# Patient Record
Sex: MALE | Race: WHITE | Employment: UNEMPLOYED | URBAN - METROPOLITAN AREA
[De-identification: names, ages, dates, MRNs, and addresses within clinical notes are randomized per-mention and may not be internally consistent; named-entity substitution may affect disease eponyms.]

---

## 2020-08-03 ENCOUNTER — HOSPITAL ENCOUNTER (OUTPATIENT)
Dept: LAB | Age: 60
Discharge: HOME | End: 2020-08-03

## 2020-08-03 ENCOUNTER — OFFICE VISIT (OUTPATIENT)
Dept: HEMATOLOGY | Age: 60
End: 2020-08-03

## 2020-08-03 VITALS
HEART RATE: 71 BPM | HEIGHT: 71 IN | SYSTOLIC BLOOD PRESSURE: 145 MMHG | OXYGEN SATURATION: 99 % | TEMPERATURE: 98.3 F | BODY MASS INDEX: 23.8 KG/M2 | WEIGHT: 170 LBS | DIASTOLIC BLOOD PRESSURE: 86 MMHG | RESPIRATION RATE: 18 BRPM

## 2020-08-03 DIAGNOSIS — B18.2 HEP C W/O COMA, CHRONIC (HCC): ICD-10-CM

## 2020-08-03 DIAGNOSIS — B18.2 HEP C W/O COMA, CHRONIC (HCC): Primary | ICD-10-CM

## 2020-08-03 PROBLEM — E55.9 VITAMIN D DEFICIENCY: Status: ACTIVE | Noted: 2020-08-03

## 2020-08-03 PROBLEM — I10 ESSENTIAL HYPERTENSION: Status: ACTIVE | Noted: 2020-08-03

## 2020-08-03 LAB — XX-LABCORP SPECIMEN COL,LCBCF: NORMAL

## 2020-08-03 PROCEDURE — 99001 SPECIMEN HANDLING PT-LAB: CPT

## 2020-08-03 RX ORDER — HYDROXYCHLOROQUINE SULFATE 200 MG/1
TABLET, FILM COATED ORAL
COMMUNITY
Start: 2020-07-20

## 2020-08-03 RX ORDER — ATENOLOL 100 MG/1
TABLET ORAL
COMMUNITY
Start: 2020-07-24

## 2020-08-03 RX ORDER — ERGOCALCIFEROL 1.25 MG/1
CAPSULE ORAL
COMMUNITY
Start: 2020-07-20 | End: 2021-12-07

## 2020-08-03 NOTE — PROGRESS NOTES
VIRTUAL TELEHEALTH VISIT PERFORMED DUE TO COVID-19 EPIDEMIC    CONSENT:  Danette Li, who was seen by synchronous, real-time, audio-video technology, and/or his healthcare decision maker, is aware that this patient-initiated, Telehealth encounter on 8/3/2020 is a billable service, with coverage as determined by his insurance carrier. He is aware that he may receive a bill and has provided verbal consent to proceed. This patient was evaluated during a Virtual Telehealth visit. A caregiver was present if appropriate.  Due to this being a TeleHealth encounter performed during the [facility] public health emergency, the physical examination was limited to that listed in the [facility].

## 2020-08-03 NOTE — PROGRESS NOTES
Identified pt with two pt identifiers(name and ). Reviewed record in preparation for visit and have obtained necessary documentation. Chief Complaint   Patient presents with    Hepatitis     hep c        Health Maintenance Due   Topic    Hepatitis C Screening     DTaP/Tdap/Td series (1 - Tdap)    Lipid Screen     Shingrix Vaccine Age 50> (1 of 2)    FOBT Q1Y Age 54-65     Influenza Age 5 to Adult        Coordination of Care Questionnaire:  :   1) Have you been to an emergency room, urgent care, or hospitalized since your last visit? If yes, where when, and reason for visit? no       2. Have seen or consulted any other health care provider since your last visit? If yes, where when, and reason for visit? NO      3) Do you have an Advanced Directive/ Living Will in place? NO  If yes, do we have a copy on file NO  If no, would you like information NO      Learning Assessment 8/3/2020   PRIMARY LEARNER Patient   HIGHEST LEVEL OF EDUCATION - PRIMARY LEARNER  GRADUATED HIGH SCHOOL OR GED   BARRIERS PRIMARY LEARNER NONE   CO-LEARNER CAREGIVER No   PRIMARY LANGUAGE ENGLISH   LEARNER PREFERENCE PRIMARY LISTENING   ANSWERED BY Patient   RELATIONSHIP SELF        3 most recent PHQ Screens 8/3/2020   Little interest or pleasure in doing things Not at all   Feeling down, depressed, irritable, or hopeless Not at all   Total Score PHQ 2 0        Abuse Screening Questionnaire 8/3/2020   Do you ever feel afraid of your partner? N   Are you in a relationship with someone who physically or mentally threatens you? N   Is it safe for you to go home? Y        Fall Risk Assessment, last 12 mths 8/3/2020   Able to walk? Yes   Fall in past 12 months?  No

## 2020-08-03 NOTE — PROGRESS NOTES
[facility], MD, [address], [place] Yolette Garvin MD Celina Mellow, NATHAN Thakkar, [facility]     Flora Mehta, [facility]   Antonina RILEY Contreras-BOLA    Franky Mayorgarid, [facility]       [facility] 136    at [address], [facility] Batsheva Villegas  22.    [phone]    FAX: [address] [place] [address], [address]    [phone]    FAX: [phone]       Patient Care Team:  Ksenia Jean MD as PCP - General (Family Medicine)  Ralph Boyd MD (Internal Medicine)      The clinicians listed above have asked me to see [facility] in consultation regarding chronic HCV and its management. No medical records were available for review when the patient was here for the appointment. The patient is a 60 y.o. /[place]ian ([place]) male who was found to have abnormalities in liver chemistries and subsequently tested positive for chronic HCV after being incarcerated in 2000. Risk factors for acquiring HCV are IV drug use, inhaling cocaine, tattoos, in 1977. There was no history of acute icteric hepatitis at the time of these risk factors. Imaging of the liver was either not performed or the results are not available to me. An assessment of liver fibrosis with biopsy or elastography has not been performed. The patient has never received treatment for chronic HCV. The patient states that \"everyday is pretty rough\". He complains of extreme fatigue, rashes, itching, lower extremity swelling, numbness and tingling in his legs and feet, sensitivity to sunlight, certain foods and drinks, and poor balance.      The patient is not currently experiencing the following symptoms of liver disease:  fevers, chills, shortness of breath, chest pain, pain in the right side over the liver, diffuse abdominal pain, nausea, vomiting, constipation, diarrhea, dry eyes,   dry mouth, yellowing of the eyes or skin, itching, dark urine, problems concentrating, swelling of the abdomen,   hematemesis, hematochezia. The patient has mild functional limitations due to these complaints. completes all daily activities without any functional limitations. ASSESSMENT AND PLAN:  Chronic HCV   Chronic HCV of unclear severity. No current labs are available for review. Will perform laboratory testing to monitor liver function and degree of liver injury. Will perform and/or review results of HCV viral load and HCV genotype to define the specific treatment and duration of treatment that will be required. Will perform serologic and virologic studies to assess for other causes of chronic liver disease. Will perform imaging of the liver with ultrasound. The degree of fibrosis will be assessed by Fibroscan at his follow up appointment in 4 weeks. Chronic HCV Treatment:  The patient has not been treated for HCV. The patient reports that he has genotype 1A. Will test genotype today. Discussed the treatment alternatives. The SVR/cure rate for HCV now exceeds 97% without significant side effects for most patients with HCV. The specific treatment is dependent upon genotype, viral load and histology. The patient should be treated with Epclusa (sofosbuvir and valpitasvir) for 12 weeks. This treatment regime was explained in detail, including dosing and side effects. Educational material was provided. Screening for Hepatocellular Carcinoma  HCC screening. AFP was ordered today and ultrasound will be scheduled.     Treatment of other medical problems in patients with chronic liver disease  There are no contraindications for the patient to take most medications that are necessary for treatment of other medical issues. The patient does not comsume alcohol. Has been sober for 21 years. Normal doses of acetaminophen, as recommended on the label of the bottle, are not hepatotoxic except in the setting of daily alcohol use, even in patients with cirrhosis and can be utilized for pain. Counseling for alcohol in patients with chronic liver disease  The patient was counseled regarding alcohol consumption and the effect of alcohol on chronic liver disease. Substance Use  The patient was counseled regarding the risk of overdose and death from using opioids. Discussed the risk of becoming reinfected with HCV once they are cured if they resume IV drug use or inhaling drugs nasally. The patient has not used drugs since \"21 years\". Vaccinations   The need for vaccination against viral hepatitis A and B will be assessed with serologic and instituted as appropriate. Routine vaccinations against other bacterial and viral agents can be performed as indicated. Annual flu vaccination should be administered if indicated. ALLERGIES  No Known Allergies    MEDICATIONS  Current Outpatient Medications   Medication Sig    atenoloL (TENORMIN) 100 mg tablet     ergocalciferol (ERGOCALCIFEROL) 1,250 mcg (50,000 unit) capsule     hydrOXYchloroQUINE (PLAQUENIL) 200 mg tablet      No current facility-administered medications for this visit. SYSTEM REVIEW NOT RELATED TO LIVER DISEASE OR REVIEWED ABOVE:  Constitution systems: Negative for fever, chills, weight gain, weight loss. Eyes: Negative for visual changes. ENT: Negative for sore throat, painful swallowing. Respiratory: Negative for cough, hemoptysis, SOB. Cardiology: Negative for chest pain, palpitations. GI:  Negative for constipation or diarrhea. : Negative for urinary frequency, dysuria, hematuria, nocturia. Skin: Negative for rash. Hematology: Negative for easy bruising, blood clots. Musculo-skelatal: Negative for back pain, muscle pain, weakness. Neurologic: Negative for headaches, dizziness, vertigo, memory problems not related to HE. Psychology: Negative for anxiety, depression. FAMILY HISTORY:  The father   of CVA in . The mother  of CVA in . There is no family history of liver disease. The following family members have immune disorders: Mother had Lupus, maternal grandmother had RA,     SOCIAL HISTORY:  The patient has never been . The patient has no children. The patient has never used tobacco products. The patient has never consumed significant amounts of alcohol. The patient has been abstinent from alcohol since  For the last 21 years. The patient is currently receiving disability. The patient was released from [facility] in 2020 after serving a 29 year sentence. PHYSICAL EXAMINATION:  Visit Vitals  /86 (BP 1 Location: Left arm, BP Patient Position: Sitting)   Pulse 71   Temp 98.3 °F (36.8 °C) (Tympanic)   Resp 18   Ht 5' 11\" (1.803 m)   Wt 170 lb (77.1 kg)   SpO2 99%   BMI 23.71 kg/m²     General: No acute distress. Eyes: Sclera anicteric. ENT: No oral lesions. Thyroid normal.  Nodes: No adenopathy. Skin: No spider angiomata. No jaundice. No palmar erythema. Respiratory: Lungs clear to auscultation. Cardiovascular: Regular heart rate. No murmurs. No JVD. Abdomen: Soft non-tender. Liver size normal to percussion/palpation. Spleen not palpable. No obvious ascites. Extremities: No edema. No muscle wasting. No gross arthritic changes. Neurologic: Alert and oriented. Cranial nerves grossly intact. No asterixis. LABORATORY STUDIES:  Recent liver function panel, CBC with platelet count and BMP are not available. These studies will be performed. SEROLOGIES:  Not available or performed. Testing was performed today.     LIVER HISTOLOGY:  Not available or performed    ENDOSCOPIC PROCEDURES:  Not available or performed    RADIOLOGY:  Not available or performed    OTHER TESTING:  Not available or performed    FOLLOW-UP:  All of the issues listed above in the Assessment and Plan were discussed with the patient. All questions were answered. The patient expressed a clear understanding of the above. [facility] 87 in 4 weeks for Fibroscan. He should be on HCV treatment by then.       RIELY Garner-BOLA  Liver [place] of [address], [address] [place] [address] Yessenia Colvin, [address] [place] [address]   [phone]

## 2020-08-10 LAB
A1AT SERPL-MCNC: 121 MG/DL (ref 101–187)
ACTIN IGG SERPL-ACNC: 7 UNITS (ref 0–19)
AFP L3 MFR SERPL: NORMAL % (ref 0–9.9)
AFP SERPL-MCNC: 2.5 NG/ML (ref 0–8)
ALBUMIN SERPL-MCNC: 4.5 G/DL (ref 3.8–4.9)
ALP SERPL-CCNC: 76 IU/L (ref 39–117)
ALT SERPL-CCNC: 32 IU/L (ref 0–44)
ANA TITR SER IF: NEGATIVE {TITER}
AST SERPL-CCNC: 36 IU/L (ref 0–40)
BASOPHILS # BLD AUTO: 0 X10E3/UL (ref 0–0.2)
BASOPHILS NFR BLD AUTO: 0 %
BILIRUB DIRECT SERPL-MCNC: 0.23 MG/DL (ref 0–0.4)
BILIRUB SERPL-MCNC: 0.7 MG/DL (ref 0–1.2)
BUN SERPL-MCNC: 13 MG/DL (ref 8–27)
BUN/CREAT SERPL: 13 (ref 10–24)
C-ANCA TITR SER IF: NORMAL TITER
CALCIUM SERPL-MCNC: 9.2 MG/DL (ref 8.6–10.2)
CHLORIDE SERPL-SCNC: 98 MMOL/L (ref 96–106)
CO2 SERPL-SCNC: 29 MMOL/L (ref 20–29)
CREAT SERPL-MCNC: 1.02 MG/DL (ref 0.76–1.27)
CRYOGLOB SER QL 1D COLD INC: NORMAL
EOSINOPHIL # BLD AUTO: 0.1 X10E3/UL (ref 0–0.4)
EOSINOPHIL NFR BLD AUTO: 2 %
ERYTHROCYTE [DISTWIDTH] IN BLOOD BY AUTOMATED COUNT: 12.4 % (ref 11.6–15.4)
FERRITIN SERPL-MCNC: 96 NG/ML (ref 30–400)
GLUCOSE SERPL-MCNC: 92 MG/DL (ref 65–99)
HAV AB SER QL IA: POSITIVE
HBV CORE AB SERPL QL IA: POSITIVE
HBV SURFACE AB SER QL: REACTIVE
HBV SURFACE AG SERPL QL IA: NEGATIVE
HCT VFR BLD AUTO: 44.1 % (ref 37.5–51)
HCV GENTYP SERPL NAA+PROBE: NORMAL
HCV RNA SERPL NAA+PROBE-ACNC: NORMAL IU/ML
HCV RNA SERPL NAA+PROBE-LOG IU: 5.82 LOG10 IU/ML
HCV RNA SERPL QL NAA+PROBE: POSITIVE
HGB BLD-MCNC: 15.2 G/DL (ref 13–17.7)
HIV 1+2 AB+HIV1 P24 AG SERPL QL IA: NON REACTIVE
IMM GRANULOCYTES # BLD AUTO: 0 X10E3/UL (ref 0–0.1)
IMM GRANULOCYTES NFR BLD AUTO: 0 %
INR PPP: 1 (ref 0.8–1.2)
IRON SATN MFR SERPL: 29 % (ref 15–55)
IRON SERPL-MCNC: 95 UG/DL (ref 38–169)
LYMPHOCYTES # BLD AUTO: 1.3 X10E3/UL (ref 0.7–3.1)
LYMPHOCYTES NFR BLD AUTO: 14 %
MCH RBC QN AUTO: 31.9 PG (ref 26.6–33)
MCHC RBC AUTO-ENTMCNC: 34.5 G/DL (ref 31.5–35.7)
MCV RBC AUTO: 93 FL (ref 79–97)
MITOCHONDRIA M2 IGG SER-ACNC: 112.3 UNITS (ref 0–20)
MONOCYTES # BLD AUTO: 0.6 X10E3/UL (ref 0.1–0.9)
MONOCYTES NFR BLD AUTO: 7 %
NEUTROPHILS # BLD AUTO: 6.6 X10E3/UL (ref 1.4–7)
NEUTROPHILS NFR BLD AUTO: 77 %
P-ANCA ATYPICAL TITR SER IF: NORMAL TITER
P-ANCA TITR SER IF: NORMAL TITER
PLATELET # BLD AUTO: 166 X10E3/UL (ref 150–450)
PLEASE NOTE, 550474: NORMAL
POTASSIUM SERPL-SCNC: 4.3 MMOL/L (ref 3.5–5.2)
PROT SERPL-MCNC: 7 G/DL (ref 6–8.5)
PROTHROMBIN TIME: 10.9 SEC (ref 9.1–12)
RBC # BLD AUTO: 4.77 X10E6/UL (ref 4.14–5.8)
SODIUM SERPL-SCNC: 140 MMOL/L (ref 134–144)
TEST INFORMATION: NORMAL
TIBC SERPL-MCNC: 328 UG/DL (ref 250–450)
UIBC SERPL-MCNC: 233 UG/DL (ref 111–343)
WBC # BLD AUTO: 8.7 X10E3/UL (ref 3.4–10.8)

## 2020-08-14 ENCOUNTER — HOSPITAL ENCOUNTER (OUTPATIENT)
Dept: ULTRASOUND IMAGING | Age: 60
Discharge: HOME | End: 2020-08-14
Payer: MEDICAID

## 2020-08-14 DIAGNOSIS — B18.2 HEP C W/O COMA, CHRONIC (HCC): ICD-10-CM

## 2020-08-14 PROCEDURE — 76705 ECHO EXAM OF ABDOMEN: CPT

## 2020-08-21 ENCOUNTER — TELEPHONE (OUTPATIENT)
Dept: HEMATOLOGY | Age: 60
End: 2020-08-21

## 2020-08-21 RX ORDER — VELPATASVIR AND SOFOSBUVIR 100; 400 MG/1; MG/1
1 TABLET, FILM COATED ORAL DAILY
Qty: 28 TAB | Refills: 2 | Status: SHIPPED | OUTPATIENT
Start: 2020-08-21 | End: 2020-10-19

## 2020-08-21 NOTE — TELEPHONE ENCOUNTER
Updated on current labs and imaging. Epclusa ordered for 12 weeks for [facility] therapy. Follow up as scheduled.

## 2020-09-02 ENCOUNTER — OFFICE VISIT (OUTPATIENT)
Dept: HEMATOLOGY | Age: 60
End: 2020-09-02

## 2020-09-02 VITALS
TEMPERATURE: 96.6 F | DIASTOLIC BLOOD PRESSURE: 85 MMHG | HEIGHT: 71 IN | HEART RATE: 69 BPM | SYSTOLIC BLOOD PRESSURE: 134 MMHG | WEIGHT: 166 LBS | BODY MASS INDEX: 23.24 KG/M2 | OXYGEN SATURATION: 99 %

## 2020-09-02 DIAGNOSIS — B18.2 HEP C W/O COMA, CHRONIC (HCC): Primary | ICD-10-CM

## 2020-09-02 PROBLEM — M35.00 SJOGREN'S SYNDROME (HCC): Status: ACTIVE | Noted: 2020-09-02

## 2020-10-14 ENCOUNTER — HOSPITAL ENCOUNTER (OUTPATIENT)
Dept: LAB | Age: 60
Discharge: HOME | End: 2020-10-14

## 2020-10-14 ENCOUNTER — OFFICE VISIT (OUTPATIENT)
Dept: HEMATOLOGY | Age: 60
End: 2020-10-14
Payer: MEDICAID

## 2020-10-14 VITALS
OXYGEN SATURATION: 98 % | HEART RATE: 84 BPM | BODY MASS INDEX: 24.5 KG/M2 | HEIGHT: 71 IN | WEIGHT: 175 LBS | DIASTOLIC BLOOD PRESSURE: 82 MMHG | SYSTOLIC BLOOD PRESSURE: 147 MMHG | TEMPERATURE: 98 F | RESPIRATION RATE: 17 BRPM

## 2020-10-14 DIAGNOSIS — B18.2 CHRONIC HEPATITIS C WITHOUT HEPATIC COMA (HCC): Primary | ICD-10-CM

## 2020-10-14 DIAGNOSIS — B18.2 CHRONIC HEPATITIS C WITHOUT HEPATIC COMA (HCC): ICD-10-CM

## 2020-10-14 LAB — XX-LABCORP SPECIMEN COL,LCBCF: NORMAL

## 2020-10-14 PROCEDURE — 99001 SPECIMEN HANDLING PT-LAB: CPT

## 2020-10-14 PROCEDURE — 99214 OFFICE O/P EST MOD 30 MIN: CPT | Performed by: NURSE PRACTITIONER

## 2020-10-14 NOTE — PROGRESS NOTES
[address] [facility], MD, Tanner Moreno MD Eudelia Diones, NATHAN Lei, [facility]     Flroa Mehta, [facility]   YAO MarieP-C    Philip Poole, [facility]       [facility] 136    at [place] [address] [address] [facility], [address] [place] [address], [facility] 22.    [phone]    FAX: [address] [place] [address], [address], [address]    [phone]    FAX: [phone]       Patient Care Team:  Kirby Purcell MD as PCP - General (Family Medicine)  Radha Vásquez MD (Internal Medicine)    Problem List  Date Reviewed: 10/14/2020          Codes Class Noted    Sjogren's syndrome [facility]) ICD-10-CM: M35.00  ICD-9-CM: 710.2  9/2/2020        Hep C w/o coma, chronic ([facility] ICD-10-CM: B18.2  ICD-9-CM: 070.54  8/3/2020        Vitamin D deficiency ICD-10-CM: E55.9  ICD-9-CM: 268.9  8/3/2020        Essential hypertension ICD-10-CM: I10  ICD-9-CM: 401.9  8/3/2020              Daniel Wyatt returns to the [address] for education and management of chronic hepatitis C. He began a 12 week antiviral regime on 09/09/2020 with once daily generic Epclusa. This is the only time the HCV has ever been treated. The active problem list, all pertinent past medical history, medications, liver histology, endoscopic studies, radiologic findings and laboratory findings related to the liver disorder were reviewed with the patient. The patient is a 60 y.o. /[place] ([place]) male who was found to have abnormalities in liver chemistries and subsequently tested positive for chronic HCV after being incarcerated in 2000. Risk factors for acquiring HCV are IV drug use, inhaling cocaine, tattoos, in 1977.           There was no history of acute icteric hepatitis at the time of these risk factors. Imaging of the liver was performed with ultrasound in 08/2020. This demonstrated a normal appearing liver. An assessment of liver fibrosis with fibroscan analysis was performed in 09/2020. This indicates no hepatic fibrosis. The patient states that \"everyday is pretty rough\". He complains of extreme fatigue, rashes, itching, lower extremity swelling, numbness and tingling in his legs and feet, sensitivity to sunlight, certain foods and drinks, and poor balance. It is unlikely any of these symptoms are related to the HCV. The patient is not currently experiencing the following symptoms of liver disease:  fevers, chills, shortness of breath, chest pain, pain in the right side over the liver, diffuse abdominal pain, nausea, vomiting, constipation, diarrhea, dry eyes, dry mouth, yellowing of the eyes or skin, itching, dark urine, problems concentrating, swelling of the abdomen, hematemesis, hematochezia. The patient has mild functional limitations due to these complaints. Since the last office appointment the patient:  Had no changes in the liver disease. He began HCV treatment on 09/09/2020 with a 12 week antiviral regime of generic Epclusa. ASSESSMENT AND PLAN:  Chronic HCV   Chronic HCV with no hepatic fibrosis per fibroscan analysis. The most recent laboratory studies indicate that the liver transaminases are normal, alkaline phosphatase is normal, tests of hepatic synthetic and metabolic function are normal, and the platelet count is normal.      Recent imaging demonstrates a normal appearing liver. Serologic evaluation was negative for all causes of chronic liver disease. Chronic HCV Treatment:  The patient has genotype 1A. He is currently in the middle of a 12 week antiviral regime. He denies missing any doses of the medication. He has no treatment related complaints.     Medication education reinforced. Screening for Hepatocellular Carcinoma  HCC screening was recently performed. AFP-L3% was WNL. Ultrasound was unremarkable. Treatment of other medical problems in patients with chronic liver disease  There are no contraindications for the patient to take most medications that are necessary for treatment of other medical issues. The patient does not comsume alcohol. Has been sober for 21 years. Normal doses of acetaminophen, as recommended on the label of the bottle, are not hepatotoxic except in the setting of daily alcohol use, even in patients with cirrhosis and can be utilized for pain. Counseling for alcohol in patients with chronic liver disease  The patient was counseled regarding alcohol consumption and the effect of alcohol on chronic liver disease. Substance Use  The patient was counseled regarding the risk of overdose and death from using opioids. Discussed the risk of becoming reinfected with HCV once they are cured if they resume IV drug use or inhaling drugs nasally. The patient has not used drugs since \"21 years\". Vaccinations   Vaccination for viral hepatitis A and B is not required. The patient has serologic evidence of prior exposure or vaccination with immunity. Routine vaccinations against other bacterial and viral agents can be performed as indicated. Annual flu vaccination should be administered if indicated. ALLERGIES  No Known Allergies    MEDICATIONS  Current Outpatient Medications   Medication Sig    atenoloL (TENORMIN) 100 mg tablet     ergocalciferol (ERGOCALCIFEROL) 1,250 mcg (50,000 unit) capsule     hydrOXYchloroQUINE (PLAQUENIL) 200 mg tablet     sofosbuvir-velpatasvir (EPCLUSA) 400-100 mg tablet TAKE ONE TABLET BY MOUTH ONCE DAILY WITH OR WITHOUT FOOD. STORE AT ROOM TEMPERATURE. No current facility-administered medications for this visit.         SYSTEM REVIEW NOT RELATED TO LIVER DISEASE OR REVIEWED ABOVE:  Constitution systems: Negative for fever, chills, weight gain, weight loss. Eyes: Negative for visual changes. ENT: Negative for sore throat, painful swallowing. Respiratory: Negative for cough, hemoptysis, SOB. Cardiology: Negative for chest pain, palpitations. GI:  Negative for constipation or diarrhea. : Negative for urinary frequency, dysuria, hematuria, nocturia. Skin: Negative for rash. Hematology: Negative for easy bruising, blood clots. Musculo-skelatal: Negative for back pain, muscle pain, weakness. Neurologic: Negative for headaches, dizziness, vertigo, memory problems not related to HE. Psychology: Negative for anxiety, depression. FAMILY HISTORY:  The father   of CVA in . The mother  of CVA in . There is no family history of liver disease. The following family members have immune disorders: Mother had Lupus, maternal grandmother had RA,     SOCIAL HISTORY:  The patient has never been . The patient has no children. The patient has never used tobacco products. The patient has never consumed significant amounts of alcohol. The patient has been abstinent from alcohol since for the last 21 years. The patient is currently receiving disability. The patient was released from detention in 2020 after serving a 29 year sentence. PHYSICAL EXAMINATION:  Visit Vitals  BP (!) 147/82 (BP 1 Location: Left arm, BP Patient Position: Sitting)   Pulse 84   Temp 98 °F (36.7 °C)   Resp 17   Ht 5' 11\" (1.803 m)   Wt 175 lb (79.4 kg)   SpO2 98%   BMI 24.41 kg/m²     General: No acute distress. Eyes: Sclera anicteric. ENT: No oral lesions. Thyroid normal.  Nodes: No adenopathy. Skin: No spider angiomata. No jaundice. No palmar erythema. Respiratory: Lungs clear to auscultation. Cardiovascular: Regular heart rate. No murmurs. No JVD. Abdomen: Soft non-tender. Liver size normal to percussion/palpation. Spleen not palpable.  No obvious ascites. Extremities: No edema. No muscle wasting. No gross arthritic changes. Neurologic: Alert and oriented. Cranial nerves grossly intact. No asterixis. LABORATORY STUDIES:  Liver [place] of [address] [place] [address] Units 10/14/2020 8/3/2020   WBC 3.4 - 10.8 x10E3/uL 7.6 8.7   ANC 1.4 - 7.0 x10E3/uL 5.8 6.6   HGB 13.0 - 17.7 g/dL 16.2 15.2    - 450 x10E3/uL 167 166   INR 0.8 - 1.2  1.0   AST 0 - 40 IU/L 34 36   ALT 0 - 44 IU/L 20 32   Alk Phos 39 - 117 IU/L 85 76   Bili, Total 0.0 - 1.2 mg/dL 0.5 0.7   Bili, Direct 0.00 - 0.40 mg/dL 0.14 0.23   Albumin 3.8 - 4.9 g/dL 4.4 4.5   BUN 8 - 27 mg/dL 11 13   Creat 0.76 - 1.27 mg/dL 1.06 1.02   Na 134 - 144 mmol/L 136 140   K 3.5 - 5.2 mmol/L 4.9 4.3   Cl 96 - 106 mmol/L 98 98   CO2 20 - 29 mmol/L 29 29   Glucose 65 - 99 mg/dL 81 92     SEROLOGIES:  Serologies Latest Ref Rng & Units 8/3/2020   Hep A Ab, Total Negative Positive (A)   Hep B Surface Ag Negative Negative   Hep B Core Ab, Total Negative Positive (A)   Hep B Surface AB QL  Reactive   Hep C Genotype  1a   HCV RT-PCR, Quant IU/mL 662,000   Ferritin 30 - 400 ng/mL 96   Iron % Saturation 15 - 55 % 29   CHELSEA, IFA  Negative   C-ANCA Neg:<1:20 titer <1:20   P-ANCA Neg:<1:20 titer <1:20   ANCA Neg:<1:20 titer <1:20   ASMCA 0 - 19 Units 7   M2 Ab 0.0 - 20.0 Units 112.3 (H)   Alpha-1 antitrypsin level 101 - 187 mg/dL 121       LIVER HISTOLOGY:  09/2020. [facility]. EkPa was 5.6. IQR/med  29%. . The results suggested a fibrosis level of F1 (due to HCV infection). The CAP score suggests fatty liver. ENDOSCOPIC PROCEDURES:  Not available or performed    RADIOLOGY:  08/2020. Ultrasound of the liver. Normal echogenicity. No solid mass. OTHER TESTING:  Not available or performed    FOLLOW-UP:  All of the issues listed above in the Assessment and Plan were discussed with the patient. All questions were answered.   The patient expressed a clear understanding of the above. [address] in 20 weeks to assess for SVR 12.           RILEY Christian-BOLA  Liver [place] of [address], [address] [place] [address], [address] [place] [address]   [phone]

## 2020-10-16 LAB
ALBUMIN SERPL-MCNC: 4.4 G/DL (ref 3.8–4.9)
ALP SERPL-CCNC: 85 IU/L (ref 39–117)
ALT SERPL-CCNC: 20 IU/L (ref 0–44)
AST SERPL-CCNC: 34 IU/L (ref 0–40)
BASOPHILS # BLD AUTO: 0 X10E3/UL (ref 0–0.2)
BASOPHILS NFR BLD AUTO: 0 %
BILIRUB DIRECT SERPL-MCNC: 0.14 MG/DL (ref 0–0.4)
BILIRUB SERPL-MCNC: 0.5 MG/DL (ref 0–1.2)
BUN SERPL-MCNC: 11 MG/DL (ref 8–27)
BUN/CREAT SERPL: 10 (ref 10–24)
CALCIUM SERPL-MCNC: 9.4 MG/DL (ref 8.6–10.2)
CHLORIDE SERPL-SCNC: 98 MMOL/L (ref 96–106)
CO2 SERPL-SCNC: 29 MMOL/L (ref 20–29)
CREAT SERPL-MCNC: 1.06 MG/DL (ref 0.76–1.27)
EOSINOPHIL # BLD AUTO: 0.1 X10E3/UL (ref 0–0.4)
EOSINOPHIL NFR BLD AUTO: 2 %
ERYTHROCYTE [DISTWIDTH] IN BLOOD BY AUTOMATED COUNT: 12.3 % (ref 11.6–15.4)
GLUCOSE SERPL-MCNC: 81 MG/DL (ref 65–99)
HBV SURFACE AG SERPL QL IA: NEGATIVE
HCT VFR BLD AUTO: 46.2 % (ref 37.5–51)
HCV RNA SERPL NAA+PROBE-ACNC: NORMAL IU/ML
HGB BLD-MCNC: 16.2 G/DL (ref 13–17.7)
IMM GRANULOCYTES # BLD AUTO: 0 X10E3/UL (ref 0–0.1)
IMM GRANULOCYTES NFR BLD AUTO: 0 %
LYMPHOCYTES # BLD AUTO: 1.1 X10E3/UL (ref 0.7–3.1)
LYMPHOCYTES NFR BLD AUTO: 14 %
MCH RBC QN AUTO: 32.3 PG (ref 26.6–33)
MCHC RBC AUTO-ENTMCNC: 35.1 G/DL (ref 31.5–35.7)
MCV RBC AUTO: 92 FL (ref 79–97)
MONOCYTES # BLD AUTO: 0.6 X10E3/UL (ref 0.1–0.9)
MONOCYTES NFR BLD AUTO: 8 %
NEUTROPHILS # BLD AUTO: 5.8 X10E3/UL (ref 1.4–7)
NEUTROPHILS NFR BLD AUTO: 76 %
PLATELET # BLD AUTO: 167 X10E3/UL (ref 150–450)
POTASSIUM SERPL-SCNC: 4.9 MMOL/L (ref 3.5–5.2)
PROT SERPL-MCNC: 6.7 G/DL (ref 6–8.5)
RBC # BLD AUTO: 5.02 X10E6/UL (ref 4.14–5.8)
SODIUM SERPL-SCNC: 136 MMOL/L (ref 134–144)
TEST INFORMATION: NORMAL
WBC # BLD AUTO: 7.6 X10E3/UL (ref 3.4–10.8)

## 2020-10-19 RX ORDER — VELPATASVIR AND SOFOSBUVIR 100; 400 MG/1; MG/1
TABLET, FILM COATED ORAL
Qty: 28 TAB | Refills: 1 | Status: SHIPPED | OUTPATIENT
Start: 2020-10-19 | End: 2021-12-07 | Stop reason: ALTCHOICE

## 2020-10-28 ENCOUNTER — HOSPITAL ENCOUNTER (OUTPATIENT)
Dept: LAB | Age: 60
Discharge: HOME | End: 2020-10-28

## 2020-10-28 LAB — XX-LABCORP SPECIMEN COL,LCBCF: NORMAL

## 2020-10-28 PROCEDURE — 99001 SPECIMEN HANDLING PT-LAB: CPT

## 2021-01-12 ENCOUNTER — HOSPITAL ENCOUNTER (OUTPATIENT)
Dept: LAB | Age: 61
Discharge: HOME | End: 2021-01-12

## 2021-01-12 LAB — XX-LABCORP SPECIMEN COL,LCBCF: NORMAL

## 2021-01-12 PROCEDURE — 99001 SPECIMEN HANDLING PT-LAB: CPT

## 2021-03-04 ENCOUNTER — HOSPITAL ENCOUNTER (OUTPATIENT)
Dept: LAB | Age: 61
Discharge: HOME | End: 2021-03-04

## 2021-03-04 ENCOUNTER — OFFICE VISIT (OUTPATIENT)
Dept: HEMATOLOGY | Age: 61
End: 2021-03-04
Payer: MEDICAID

## 2021-03-04 VITALS
DIASTOLIC BLOOD PRESSURE: 73 MMHG | BODY MASS INDEX: 24.78 KG/M2 | RESPIRATION RATE: 17 BRPM | HEIGHT: 71 IN | WEIGHT: 177 LBS | TEMPERATURE: 97.5 F | SYSTOLIC BLOOD PRESSURE: 141 MMHG | OXYGEN SATURATION: 98 % | HEART RATE: 92 BPM

## 2021-03-04 DIAGNOSIS — B18.2 CHRONIC HEPATITIS C WITHOUT HEPATIC COMA (HCC): Primary | ICD-10-CM

## 2021-03-04 LAB — XX-LABCORP SPECIMEN COL,LCBCF: NORMAL

## 2021-03-04 PROCEDURE — 99214 OFFICE O/P EST MOD 30 MIN: CPT | Performed by: NURSE PRACTITIONER

## 2021-03-04 PROCEDURE — 99001 SPECIMEN HANDLING PT-LAB: CPT

## 2021-03-04 NOTE — PROGRESS NOTES
[address] [facility], Brett INIGUEZ Ascencion Sloop, MD Janita Bernhardt, PA-BOLA Ontiveros, [facility]     Flora Mehta, [facility]   Izora Hashimoto, FNCLAUDIA-BOLA Barnes, [facility]       Domitila Copeland Duglas De Acuna 136    at [address], [facility] Batsheva Campuzano  22.    [phone]    FAX: [address] [place] [address], [address]    [phone]    FAX: [phone]       Patient Care Team:  Olya Castro MD as PCP - General (Family Medicine)  Lucas Camarillo MD (Internal Medicine)    Problem List  Date Reviewed: 10/14/2020          Codes Class Noted    Sjogren's syndrome [facility]) ICD-10-CM: M35.00  ICD-9-CM: 710.2  9/2/2020        Hep C w/o coma, chronic ([facility] ICD-10-CM: B18.2  ICD-9-CM: 070.54  8/3/2020        Vitamin D deficiency ICD-10-CM: E55.9  ICD-9-CM: 268.9  8/3/2020        Essential hypertension ICD-10-CM: I10  ICD-9-CM: 401.9  8/3/2020              Wilbert Brewster returns to the [address] for education and management of chronic hepatitis C. He began a 12 week antiviral regime on 09/09/2020 with once daily generic Epclusa and completed the regime on 12/04/2020. This is the only time the HCV has ever been treated. The active problem list, all pertinent past medical history, medications, liver histology, endoscopic studies, radiologic findings and laboratory findings related to the liver disorder were reviewed with the patient. The patient is a 63 y.o. / ([place]) male who was found to have abnormalities in liver chemistries and subsequently tested positive for chronic HCV after being incarcerated in 2000.        Risk factors for acquiring HCV are IV drug use, inhaling cocaine, tattoos, in 1977.          There was no history of acute icteric hepatitis at the time of these risk factors. Imaging of the liver was performed with ultrasound in 08/2020. This demonstrated a normal appearing liver. An assessment of liver fibrosis with fibroscan analysis was performed in 09/2020. This indicates no hepatic fibrosis. The patient is not currently experiencing the following symptoms of liver disease:  fevers, chills, shortness of breath, chest pain, pain in the right side over the liver, diffuse abdominal pain, nausea, vomiting, constipation, diarrhea, dry eyes, dry mouth, yellowing of the eyes or skin, itching, dark urine, problems concentrating, swelling of the abdomen, hematemesis, hematochezia. The patient has mild functional limitations due to these complaints. Since the last office appointment the patient:  Had no changes in the liver disease. He began HCV treatment on 09/09/2020 with a 12 week antiviral regime of generic Epclusa. Finished Epclusa in 12/04/2020. ASSESSMENT AND PLAN:  Chronic HCV   Chronic HCV with no hepatic fibrosis per fibroscan analysis. The most recent laboratory studies indicate that the liver transaminases are normal, alkaline phosphatase is normal, tests of hepatic synthetic and metabolic function are normal, and the platelet count is normal.      Recent imaging demonstrates a normal appearing liver. Serologic evaluation was negative for all causes of chronic liver disease. Chronic HCV Treatment:  The patient has genotype 1A. He completed the 12 weeks regime in early 12/2020. He denies missing any doses of the medication. He has no treatment related complaints. Screening for Hepatocellular Carcinoma  HCC screening was recently performed. AFP-L3% was WNL. Ultrasound was unremarkable.       Treatment of other medical problems in patients with chronic liver disease  There are no contraindications for the patient to take most medications that are necessary for treatment of other medical issues. The patient does not comsume alcohol. Has been sober for 22 years. Normal doses of acetaminophen, as recommended on the label of the bottle, are not hepatotoxic except in the setting of daily alcohol use, even in patients with cirrhosis and can be utilized for pain. Counseling for alcohol in patients with chronic liver disease  The patient was counseled regarding alcohol consumption and the effect of alcohol on chronic liver disease. Substance Use  The patient was counseled regarding the risk of overdose and death from using opioids. Discussed the risk of becoming reinfected with HCV once they are cured if they resume IV drug use or inhaling drugs nasally. The patient has not used drugs since \"21 years\". Vaccinations   Vaccination for viral hepatitis A and B is not required. The patient has serologic evidence of prior exposure or vaccination with immunity. Routine vaccinations against other bacterial and viral agents can be performed as indicated. Annual flu vaccination should be administered if indicated. ALLERGIES  No Known Allergies    MEDICATIONS  Current Outpatient Medications   Medication Sig    sofosbuvir-velpatasvir (EPCLUSA) 400-100 mg tablet TAKE ONE TABLET BY MOUTH ONCE DAILY WITH OR WITHOUT FOOD. STORE AT ROOM TEMPERATURE.  atenoloL (TENORMIN) 100 mg tablet     ergocalciferol (ERGOCALCIFEROL) 1,250 mcg (50,000 unit) capsule     hydrOXYchloroQUINE (PLAQUENIL) 200 mg tablet      No current facility-administered medications for this visit. SYSTEM REVIEW NOT RELATED TO LIVER DISEASE OR REVIEWED ABOVE:  Constitution systems: Negative for fever, chills, weight gain, weight loss. Eyes: Negative for visual changes. ENT: Negative for sore throat, painful swallowing. Respiratory: Negative for cough, hemoptysis, SOB. Cardiology: Negative for chest pain, palpitations.   GI:  Negative for constipation or diarrhea. : Negative for urinary frequency, dysuria, hematuria, nocturia. Skin: Negative for rash. Hematology: Negative for easy bruising, blood clots. Musculo-skelatal: Negative for back pain, muscle pain, weakness. Neurologic: Negative for headaches, dizziness, vertigo, memory problems not related to HE. Psychology: Negative for anxiety, depression. FAMILY HISTORY:  The father   of CVA in . The mother  of CVA in . There is no family history of liver disease. The following family members have immune disorders: Mother had Lupus, maternal grandmother had RA,     SOCIAL HISTORY:  The patient has never been . The patient has no children. The patient has never used tobacco products. The patient has never consumed significant amounts of alcohol. The patient has been abstinent from alcohol since for the last 21 years. The patient is currently receiving disability. The patient was released from [facility] in 2020 after serving a 29 year sentence. PHYSICAL EXAMINATION:  Visit Vitals  BP (!) 141/73 (BP 1 Location: Left upper arm, BP Patient Position: Sitting)   Pulse 92   Temp 97.5 °F (36.4 °C)   Resp 17   Ht 5' 11\" (1.803 m)   Wt 177 lb (80.3 kg)   SpO2 98%   BMI 24.69 kg/m²     General: No acute distress. Eyes: Sclera anicteric. ENT: No oral lesions. Thyroid normal.  Nodes: No adenopathy. Skin: No spider angiomata. No jaundice. No palmar erythema. Respiratory: Lungs clear to auscultation. Cardiovascular: Regular heart rate. No murmurs. No JVD. Abdomen: Soft non-tender. Liver size normal to percussion/palpation. Spleen not palpable. No obvious ascites. Extremities: No edema. No muscle wasting. No gross arthritic changes. Neurologic: Alert and oriented. Cranial nerves grossly intact. No asterixis.     LABORATORY STUDIES:  Liver [place] [address] Units 3/4/2021 10/14/2020 8/3/2020   WBC 3.4 - 10.8 x10E3/uL 6.9 7.6 8.7   ANC 1.4 - 7.0 x10E3/uL 5.2 5.8 6.6   HGB 13.0 - 17.7 g/dL 15.9 16.2 15.2    - 450 x10E3/uL 161 167 166   INR 0.8 - 1.2   1.0   AST 0 - 40 IU/L 32 34 36   ALT 0 - 44 IU/L 21 20 32   Alk Phos 39 - 117 IU/L 85 85 76   Bili, Total 0.0 - 1.2 mg/dL 0.5 0.5 0.7   Bili, Direct 0.00 - 0.40 mg/dL 0.16 0.14 0.23   Albumin 3.8 - 4.8 g/dL 4.6 4.4 4.5   BUN 8 - 27 mg/dL 10 11 13   Creat 0.76 - 1.27 mg/dL 0.87 1.06 1.02   Na 134 - 144 mmol/L 142 136 140   K 3.5 - 5.2 mmol/L 4.3 4.9 4.3   Cl 96 - 106 mmol/L 100 98 98   CO2 20 - 29 mmol/L 27 29 29   Glucose 65 - 99 mg/dL 95 81 92     SEROLOGIES:  Serologies Latest Ref Rng & Units 8/3/2020   Hep A Ab, Total Negative Positive (A)   Hep B Surface Ag Negative Negative   Hep B Core Ab, Total Negative Positive (A)   Hep B Surface AB QL  Reactive   Hep C Genotype  1a   HCV RT-PCR, Quant IU/mL 662,000   Ferritin 30 - 400 ng/mL 96   Iron % Saturation 15 - 55 % 29   CHELSEA, IFA  Negative   C-ANCA Neg:<1:20 titer <1:20   P-ANCA Neg:<1:20 titer <1:20   ANCA Neg:<1:20 titer <1:20   ASMCA 0 - 19 Units 7   M2 Ab 0.0 - 20.0 Units 112.3 (H)   Alpha-1 antitrypsin level 101 - 187 mg/dL 121       LIVER HISTOLOGY:  09/2020. [facility]. EkPa was 5.6. IQR/med  29%. . The results suggested a fibrosis level of F1 (due to HCV infection). The CAP score suggests fatty liver. ENDOSCOPIC PROCEDURES:  Not available or performed    RADIOLOGY:  08/2020. Ultrasound of the liver. Normal echogenicity. No solid mass. OTHER TESTING:  Not available or performed    FOLLOW-UP:  All of the issues listed above in the Assessment and Plan were discussed with the patient. All questions were answered. The patient expressed a clear understanding of the above. [address] in 9 months for repeat fibroscan and labs. This will likely be his last visit at the [facility].        JHON Perales  Liver [address] [place] [address],[address], [address] [place] [address], [address] [place] [address]   [phone]

## 2021-03-05 LAB
ALBUMIN SERPL-MCNC: 4.6 G/DL (ref 3.8–4.8)
ALP SERPL-CCNC: 85 IU/L (ref 39–117)
ALT SERPL-CCNC: 21 IU/L (ref 0–44)
AST SERPL-CCNC: 32 IU/L (ref 0–40)
BASOPHILS # BLD AUTO: 0 X10E3/UL (ref 0–0.2)
BASOPHILS NFR BLD AUTO: 0 %
BILIRUB DIRECT SERPL-MCNC: 0.16 MG/DL (ref 0–0.4)
BILIRUB SERPL-MCNC: 0.5 MG/DL (ref 0–1.2)
BUN SERPL-MCNC: 10 MG/DL (ref 8–27)
BUN/CREAT SERPL: 11 (ref 10–24)
CALCIUM SERPL-MCNC: 9.4 MG/DL (ref 8.6–10.2)
CHLORIDE SERPL-SCNC: 100 MMOL/L (ref 96–106)
CO2 SERPL-SCNC: 27 MMOL/L (ref 20–29)
CREAT SERPL-MCNC: 0.87 MG/DL (ref 0.76–1.27)
EOSINOPHIL # BLD AUTO: 0.1 X10E3/UL (ref 0–0.4)
EOSINOPHIL NFR BLD AUTO: 1 %
ERYTHROCYTE [DISTWIDTH] IN BLOOD BY AUTOMATED COUNT: 12.1 % (ref 11.6–15.4)
GLUCOSE SERPL-MCNC: 95 MG/DL (ref 65–99)
HCT VFR BLD AUTO: 46.7 % (ref 37.5–51)
HGB BLD-MCNC: 15.9 G/DL (ref 13–17.7)
IMM GRANULOCYTES # BLD AUTO: 0 X10E3/UL (ref 0–0.1)
IMM GRANULOCYTES NFR BLD AUTO: 0 %
LYMPHOCYTES # BLD AUTO: 1.1 X10E3/UL (ref 0.7–3.1)
LYMPHOCYTES NFR BLD AUTO: 16 %
MCH RBC QN AUTO: 31.9 PG (ref 26.6–33)
MCHC RBC AUTO-ENTMCNC: 34 G/DL (ref 31.5–35.7)
MCV RBC AUTO: 94 FL (ref 79–97)
MONOCYTES # BLD AUTO: 0.5 X10E3/UL (ref 0.1–0.9)
MONOCYTES NFR BLD AUTO: 7 %
NEUTROPHILS # BLD AUTO: 5.2 X10E3/UL (ref 1.4–7)
NEUTROPHILS NFR BLD AUTO: 76 %
PLATELET # BLD AUTO: 161 X10E3/UL (ref 150–450)
POTASSIUM SERPL-SCNC: 4.3 MMOL/L (ref 3.5–5.2)
PROT SERPL-MCNC: 6.7 G/DL (ref 6–8.5)
RBC # BLD AUTO: 4.98 X10E6/UL (ref 4.14–5.8)
SODIUM SERPL-SCNC: 142 MMOL/L (ref 134–144)
WBC # BLD AUTO: 6.9 X10E3/UL (ref 3.4–10.8)

## 2021-03-06 LAB
HCV RNA SERPL NAA+PROBE-ACNC: NORMAL IU/ML
TEST INFORMATION: NORMAL

## 2021-12-07 ENCOUNTER — OFFICE VISIT (OUTPATIENT)
Dept: HEMATOLOGY | Age: 61
End: 2021-12-07
Payer: MEDICAID

## 2021-12-07 ENCOUNTER — HOSPITAL ENCOUNTER (OUTPATIENT)
Dept: LAB | Age: 61
Discharge: HOME | End: 2021-12-07

## 2021-12-07 VITALS
WEIGHT: 177 LBS | HEART RATE: 66 BPM | RESPIRATION RATE: 16 BRPM | DIASTOLIC BLOOD PRESSURE: 68 MMHG | OXYGEN SATURATION: 98 % | BODY MASS INDEX: 24.78 KG/M2 | HEIGHT: 71 IN | SYSTOLIC BLOOD PRESSURE: 121 MMHG | TEMPERATURE: 97.1 F

## 2021-12-07 DIAGNOSIS — B18.2 CHRONIC HEPATITIS C WITHOUT HEPATIC COMA (HCC): Primary | ICD-10-CM

## 2021-12-07 PROBLEM — Z86.19 HEPATITIS C VIRUS INFECTION CURED AFTER ANTIVIRAL DRUG THERAPY: Status: ACTIVE | Noted: 2021-12-07

## 2021-12-07 LAB — XX-LABCORP SPECIMEN COL,LCBCF: NORMAL

## 2021-12-07 PROCEDURE — 99001 SPECIMEN HANDLING PT-LAB: CPT

## 2021-12-07 PROCEDURE — 91200 LIVER ELASTOGRAPHY: CPT | Performed by: NURSE PRACTITIONER

## 2021-12-07 PROCEDURE — 99214 OFFICE O/P EST MOD 30 MIN: CPT | Performed by: NURSE PRACTITIONER

## 2021-12-07 RX ORDER — GABAPENTIN 100 MG/1
CAPSULE ORAL
COMMUNITY
Start: 2021-01-26

## 2021-12-07 RX ORDER — SILDENAFIL 100 MG/1
TABLET, FILM COATED ORAL DAILY
COMMUNITY
Start: 2021-03-16

## 2021-12-07 RX ORDER — SULFAMETHOXAZOLE AND TRIMETHOPRIM 800; 160 MG/1; MG/1
1 TABLET ORAL 2 TIMES DAILY
COMMUNITY
Start: 2021-11-29 | End: 2021-12-29

## 2021-12-07 NOTE — PROGRESS NOTES
[facility], MD, MD Selvin Amado PA-C Almetta Gaudier, [facility]     Flora Mehta, [facility]   Melodie Barnes P-BOLA Meyers Asp, [facility]       Domitila Ardon De Acuna 136    at [address], [address] Batsheva Villegas  22.    [phone]    FAX: [facility]    at [address], [address]    [phone]    FAX: [phone]       Patient Care Team:  Calin Aguilera MD as PCP - General (Family Medicine)  Manisha Madison MD (Internal Medicine)    Problem List  Date Reviewed: 12/7/2021          Codes Class Noted    Hepatitis C virus infection cured after antiviral drug therapy ICD-10-CM: Z86.19  ICD-9-CM: V12.09  12/7/2021        Sjogren's syndrome ([facility].) ICD-10-CM: M35.00  ICD-9-CM: 710.2  9/2/2020        Vitamin D deficiency ICD-10-CM: E55.9  ICD-9-CM: 268.9  8/3/2020        Essential hypertension ICD-10-CM: I10  ICD-9-CM: 401.9  8/3/2020              Dany Bower returns to the [facility] today for fibroscan assessment of hepatic fibrosis and for education and management of chronic hepatitis C. He began a 12 week antiviral regime on 09/09/2020 with once daily generic Epclusa and completed the regime on 12/04/2020. This is the only time the HCV has ever been treated. HCV has been eradicated. The active problem list, all pertinent past medical history, medications, liver histology, endoscopic studies, radiologic findings and laboratory findings related to the liver disorder were reviewed with the patient. The patient is a 57 y.o.  /[place] ([place]) male who was found to have abnormalities in liver chemistries and subsequently tested positive for chronic HCV after being incarcerated in 2000. Risk factors for acquiring HCV are IV drug use, inhaling cocaine, tattoos, in 1977. There was no history of acute icteric hepatitis at the time of these risk factors. Imaging of the liver was performed with ultrasound in 08/2020. This demonstrated a normal appearing liver. An assessment of liver fibrosis with fibroscan analysis was performed in 09/2020. This indicates no hepatic fibrosis. The fibroscan was repeated during this appointment. Results of the scan indicate normal liver stiffness. The patient is not currently experiencing the following symptoms of liver disease:  fevers, chills, shortness of breath, chest pain, pain in the right side over the liver, diffuse abdominal pain, nausea, vomiting, constipation, diarrhea, dry eyes, dry mouth, yellowing of the eyes or skin, itching, dark urine, problems concentrating, swelling of the abdomen, hematemesis, hematochezia. The patient has mild functional limitations due to these complaints. Since the last office appointment the patient:  Had no changes in the liver disease. ASSESSMENT AND PLAN:  Chronic HCV   Chronic HCV with no hepatic fibrosis per fibroscan analysis. The HCV has been treated and cured. The most recent laboratory studies indicate that the liver transaminases are normal, alkaline phosphatase is normal, tests of hepatic synthetic and metabolic function are normal, and the platelet count is normal.      Recent imaging demonstrates a normal appearing liver. Serologic evaluation was negative for all causes of chronic liver disease. Chronic HCV Treatment:  The patient had genotype 1A. He completed the 12 weeks regime in early 12/2020. He denies missing any doses of the medication. He has no treatment related complaints. The HCV infection has been eradicated. Patient remains virus free, SVR (sustained virological response).      No further follow ups are needed here. Fibrosis score  The need to perform an assessment of liver fibrosis was discussed with the patient. The Fibroscan can assess liver fibrosis and determine if a patient has advanced fibrosis or cirrhosis without the need for liver biopsy. This was performed in 09/2020 and indicates normal liver stiffness (no fibrosis). The fibroscan was repeated during this appointment. Results of the scan indicate normal liver stiffness and there are no indications of fatty liver disease. Screening for Hepatocellular Carcinoma  HCC screening was recently performed. AFP-L3% was WNL. Ultrasound was unremarkable. Treatment of other medical problems in patients with chronic liver disease  There are no contraindications for the patient to take most medications that are necessary for treatment of other medical issues. The patient does not comsume alcohol. Has been sober for 23 years. Normal doses of acetaminophen, as recommended on the label of the bottle, are not hepatotoxic except in the setting of daily alcohol use, even in patients with cirrhosis and can be utilized for pain. Counseling for alcohol in patients with chronic liver disease  The patient was counseled regarding alcohol consumption and the effect of alcohol on chronic liver disease. Substance Use  The patient was counseled regarding the risk of overdose and death from using opioids. Discussed the risk of becoming reinfected with HCV once they are cured if they resume IV drug use or inhaling drugs nasally. The patient has not used drugs since \"23 years\". Vaccinations   Vaccination for viral hepatitis A and B is not required. The patient has serologic evidence of prior exposure or vaccination with immunity. Routine vaccinations against other bacterial and viral agents can be performed as indicated. Annual flu vaccination should be administered if indicated.     ALLERGIES  No Known Allergies    MEDICATIONS  Current Outpatient Medications   Medication Sig    sofosbuvir-velpatasvir (EPCLUSA) 400-100 mg tablet TAKE ONE TABLET BY MOUTH ONCE DAILY WITH OR WITHOUT FOOD. STORE AT ROOM TEMPERATURE.  atenoloL (TENORMIN) 100 mg tablet     ergocalciferol (ERGOCALCIFEROL) 1,250 mcg (50,000 unit) capsule     hydrOXYchloroQUINE (PLAQUENIL) 200 mg tablet      No current facility-administered medications for this visit. SYSTEM REVIEW NOT RELATED TO LIVER DISEASE OR REVIEWED ABOVE:  Constitution systems: Negative for fever, chills, weight gain, weight loss. Eyes: Negative for visual changes. ENT: Negative for sore throat, painful swallowing. Respiratory: Negative for cough, hemoptysis, SOB. Cardiology: Negative for chest pain, palpitations. GI:  Negative for constipation or diarrhea. : Negative for urinary frequency, dysuria, hematuria, nocturia. Skin: Negative for rash. Hematology: Negative for easy bruising, blood clots. Musculo-skelatal: Negative for back pain, muscle pain, weakness. Neurologic: Negative for headaches, dizziness, vertigo, memory problems not related to HE. Psychology: Negative for anxiety, depression. FAMILY HISTORY:  The father   of CVA in . The mother  of CVA in . There is no family history of liver disease. The following family members have immune disorders: Mother had Lupus, maternal grandmother had RA,     SOCIAL HISTORY:  The patient has never been . The patient has no children. The patient has never used tobacco products. The patient has never consumed significant amounts of alcohol. The patient has been abstinent from alcohol since for the last 21 years. The patient is currently receiving disability. The patient was released from [facility] in 2020 after serving a 29 year sentence.         PHYSICAL EXAMINATION:  Visit Vitals  /68 (BP 1 Location: Left upper arm, BP Patient Position: Sitting, BP Cuff Size: Small adult)   Pulse 66   Temp 97.1 °F (36.2 °C)   Resp 16   Ht 5' 11\" (1.803 m)   Wt 177 lb (80.3 kg)   SpO2 98%   BMI 24.69 kg/m²     General: No acute distress. Eyes: Sclera anicteric. ENT: No oral lesions. Thyroid normal.  Nodes: No adenopathy. Skin: No spider angiomata. No jaundice. No palmar erythema. Respiratory: Lungs clear to auscultation. Cardiovascular: Regular heart rate. No murmurs. No JVD. Abdomen: Soft non-tender. Liver size normal to percussion/palpation. Spleen not palpable. No obvious ascites. Extremities: No edema. No muscle wasting. No gross arthritic changes. Neurologic: Alert and oriented. Cranial nerves grossly intact. No asterixis. LABORATORY STUDIES:  Liver [place] [address] Units 12/7/2021 3/4/2021   WBC 3.4 - 10.8 x10E3/uL 6.2 6.9   ANC 1.4 - 7.0 x10E3/uL 4.3 5.2   HGB 13.0 - 17.7 g/dL 15.4 15.9    - 450 x10E3/uL 222 161   INR 0.8 - 1.2     AST 0 - 40 IU/L 32 32   ALT 0 - 44 IU/L 21 21   Alk Phos 44 - 121 IU/L 77 85   Bili, Total 0.0 - 1.2 mg/dL 0.4 0.5   Bili, Direct 0.00 - 0.40 mg/dL 0.13 0.16   Albumin 3.8 - 4.8 g/dL 4.5 4.6   BUN 8 - 27 mg/dL 10 10   Creat 0.76 - 1.27 mg/dL 1.22 0.87   Na 134 - 144 mmol/L 136 142   K 3.5 - 5.2 mmol/L 4.6 4.3   Cl 96 - 106 mmol/L 98 100   CO2 20 - 29 mmol/L 26 27   Glucose 65 - 99 mg/dL 90 95     SEROLOGIES:  Serologies Latest Ref Rng & Units 8/3/2020   Hep A Ab, Total Negative Positive (A)   Hep B Surface Ag Negative Negative   Hep B Core Ab, Total Negative Positive (A)   Hep B Surface AB QL  Reactive   Hep C Genotype  1a   HCV RT-PCR, Quant IU/mL 662,000   Ferritin 30 - 400 ng/mL 96   Iron % Saturation 15 - 55 % 29   CHELSEA, IFA  Negative   C-ANCA Neg:<1:20 titer <1:20   P-ANCA Neg:<1:20 titer <1:20   ANCA Neg:<1:20 titer <1:20   ASMCA 0 - 19 Units 7   M2 Ab 0.0 - 20.0 Units 112.3 (H)   Alpha-1 antitrypsin level 101 - 187 mg/dL 121       LIVER HISTOLOGY:  09/2020. FibroScan performed at [address].  Chris was 5.6. IQR/med  29%. . The results suggested a fibrosis level of F1 (due to HCV infection). The CAP score suggests fatty liver. 12/2021. FibroScan performed at [address]. EkPa was 3.7. IQR/med 20%. . The results suggested a fibrosis level of F0. The CAP score suggests there is no significant hepatic steatosis. ENDOSCOPIC PROCEDURES:  Not available or performed    RADIOLOGY:  08/2020. Ultrasound of the liver. Normal echogenicity. No solid mass. OTHER TESTING:  Not available or performed    FOLLOW-UP:  All of the issues listed above in the Assessment and Plan were discussed with the patient. All questions were answered. The patient expressed a clear understanding of the above. Follow-up [facility] on a PRN basis.       RILEY Loving-BOLA  Liver [place] [address]., [address] [place] [address]   [phone]    l

## 2021-12-09 LAB
ALBUMIN SERPL-MCNC: 4.5 G/DL (ref 3.8–4.8)
ALP SERPL-CCNC: 77 IU/L (ref 44–121)
ALT SERPL-CCNC: 21 IU/L (ref 0–44)
AST SERPL-CCNC: 32 IU/L (ref 0–40)
BASOPHILS # BLD AUTO: 0 X10E3/UL (ref 0–0.2)
BASOPHILS NFR BLD AUTO: 1 %
BILIRUB DIRECT SERPL-MCNC: 0.13 MG/DL (ref 0–0.4)
BILIRUB SERPL-MCNC: 0.4 MG/DL (ref 0–1.2)
BUN SERPL-MCNC: 10 MG/DL (ref 8–27)
BUN/CREAT SERPL: 8 (ref 10–24)
CALCIUM SERPL-MCNC: 9.2 MG/DL (ref 8.6–10.2)
CHLORIDE SERPL-SCNC: 98 MMOL/L (ref 96–106)
CO2 SERPL-SCNC: 26 MMOL/L (ref 20–29)
CREAT SERPL-MCNC: 1.22 MG/DL (ref 0.76–1.27)
EOSINOPHIL # BLD AUTO: 0.1 X10E3/UL (ref 0–0.4)
EOSINOPHIL NFR BLD AUTO: 1 %
ERYTHROCYTE [DISTWIDTH] IN BLOOD BY AUTOMATED COUNT: 12.2 % (ref 11.6–15.4)
GLUCOSE SERPL-MCNC: 90 MG/DL (ref 65–99)
HCT VFR BLD AUTO: 44.9 % (ref 37.5–51)
HCV RNA SERPL NAA+PROBE-ACNC: NORMAL IU/ML
HGB BLD-MCNC: 15.4 G/DL (ref 13–17.7)
IMM GRANULOCYTES # BLD AUTO: 0 X10E3/UL (ref 0–0.1)
IMM GRANULOCYTES NFR BLD AUTO: 1 %
LYMPHOCYTES # BLD AUTO: 1.2 X10E3/UL (ref 0.7–3.1)
LYMPHOCYTES NFR BLD AUTO: 19 %
MCH RBC QN AUTO: 32.1 PG (ref 26.6–33)
MCHC RBC AUTO-ENTMCNC: 34.3 G/DL (ref 31.5–35.7)
MCV RBC AUTO: 94 FL (ref 79–97)
MONOCYTES # BLD AUTO: 0.5 X10E3/UL (ref 0.1–0.9)
MONOCYTES NFR BLD AUTO: 9 %
NEUTROPHILS # BLD AUTO: 4.3 X10E3/UL (ref 1.4–7)
NEUTROPHILS NFR BLD AUTO: 69 %
PLATELET # BLD AUTO: 222 X10E3/UL (ref 150–450)
POTASSIUM SERPL-SCNC: 4.6 MMOL/L (ref 3.5–5.2)
PROT SERPL-MCNC: 6.6 G/DL (ref 6–8.5)
RBC # BLD AUTO: 4.8 X10E6/UL (ref 4.14–5.8)
SODIUM SERPL-SCNC: 136 MMOL/L (ref 134–144)
TEST INFORMATION: NORMAL
WBC # BLD AUTO: 6.2 X10E3/UL (ref 3.4–10.8)

## 2021-12-16 ENCOUNTER — TRANSCRIBE ORDER (OUTPATIENT)
Dept: SCHEDULING | Age: 61
End: 2021-12-16

## 2021-12-16 DIAGNOSIS — Z87.438 HISTORY OF CHRONIC PROSTATITIS: Primary | ICD-10-CM

## 2021-12-21 ENCOUNTER — HOSPITAL ENCOUNTER (OUTPATIENT)
Dept: ULTRASOUND IMAGING | Age: 61
Discharge: HOME | End: 2021-12-21
Attending: INTERNAL MEDICINE
Payer: MEDICAID

## 2021-12-21 DIAGNOSIS — Z87.438 HISTORY OF CHRONIC PROSTATITIS: ICD-10-CM

## 2021-12-21 PROCEDURE — 76700 US EXAM ABDOM COMPLETE: CPT

## 2021-12-21 PROCEDURE — 76856 US EXAM PELVIC COMPLETE: CPT

## 2021-12-23 ENCOUNTER — TRANSCRIBE ORDER (OUTPATIENT)
Dept: SCHEDULING | Age: 61
End: 2021-12-23

## 2021-12-23 DIAGNOSIS — Z98.890 STATUS POST REPAIR OF HYDROCELE: Primary | ICD-10-CM

## 2021-12-23 DIAGNOSIS — Z87.438 STATUS POST REPAIR OF HYDROCELE: Primary | ICD-10-CM

## 2021-12-31 ENCOUNTER — TRANSCRIBE ORDER (OUTPATIENT)
Dept: SCHEDULING | Age: 61
End: 2021-12-31

## 2021-12-31 DIAGNOSIS — Z87.438 HX OF CHRONIC PROSTATITIS: Primary | ICD-10-CM

## 2022-03-18 PROBLEM — E55.9 VITAMIN D DEFICIENCY: Status: ACTIVE | Noted: 2020-08-03

## 2022-03-19 PROBLEM — M35.00 SJOGREN'S SYNDROME (HCC): Status: ACTIVE | Noted: 2020-09-02

## 2022-03-19 PROBLEM — Z86.19 HEPATITIS C VIRUS INFECTION CURED AFTER ANTIVIRAL DRUG THERAPY: Status: ACTIVE | Noted: 2021-12-07

## 2022-03-20 PROBLEM — I10 ESSENTIAL HYPERTENSION: Status: ACTIVE | Noted: 2020-08-03

## 2023-06-18 NOTE — PROGRESS NOTES
[facility], MD, MD Margie Akbar PA-C Janeann Barters, [facility]     April S Janine, [facility]   Evan Donahue [facility] [place], [facility]       Domitiladre Copeland [facility] 136    at [address]    [address] [place] [address], [facility] Batsheva Villegas  22.    [phone]    FAX: [address] [place] [address] Avenue    [address], [address]    [phone]    FAX: [phone]       Patient Care Team:  Miguel Solis MD as PCP - General (Family Medicine)  Yuri Kennedy MD (Internal Medicine)    Problem List  Date Reviewed: 8/3/2020          Codes Class Noted    Hep C w/o coma, chronic ([facility] ICD-10-CM: B18.2  ICD-9-CM: 070.54  8/3/2020        Vitamin D deficiency ICD-10-CM: E55.9  ICD-9-CM: 268.9  8/3/2020        Essential hypertension ICD-10-CM: I10  ICD-9-CM: 401.9  8/3/2020              Shraddha Carlson returns to the [place] [address] today for fibroscan assessment of hepatic fibrosis and for education and management of chronic hepatitis C    The active problem list, all pertinent past medical history, medications, liver histology, endoscopic studies, radiologic findings and laboratory findings related to the liver disorder were reviewed with the patient. The patient is a 60 y.o. / ([place]) male who was found to have abnormalities in liver chemistries and subsequently tested positive for chronic HCV after being incarcerated in 2000. Risk factors for acquiring HCV are IV drug use, inhaling cocaine, tattoos, in 1977. There was no history of acute icteric hepatitis at the time of these risk factors. Imaging of the liver was either not performed or the results are not available to me.       An assessment of Per patient unable to transfer to recliner this am 2/2 left leg fracture, encouraged incentive spirometer 10 x hour to prevent pneumonia, verbally understood, return demonstrated correct technique liver fibrosis with fibroscan analysis was performed in the office today. This suggests no hepatic fibrosis. The patient has never received treatment for chronic HCV. Antiviral medication has been ordered and approved. The patient is set to begin HCV treatment on 09/08/2020 with a 12 weeks course of Epclusa. The patient states that \"everyday is pretty rough\". He complains of extreme fatigue, rashes, itching, lower extremity swelling, numbness and tingling in his legs and feet, sensitivity to sunlight, certain foods and drinks, and poor balance. The patient is not currently experiencing the following symptoms of liver disease:  fevers, chills, shortness of breath, chest pain, pain in the right side over the liver, diffuse abdominal pain, nausea, vomiting, constipation, diarrhea, dry eyes,   dry mouth, yellowing of the eyes or skin, itching, dark urine, problems concentrating, swelling of the abdomen,   hematemesis, hematochezia. The patient has mild functional limitations due to these complaints. completes all daily activities without any functional limitations. Since the last office appointment the patient:  Had no changes in the liver disease. Will begin HCV on either Sep. 8th or 9th when the Elijah Jo arrives. ASSESSMENT AND PLAN:  Chronic HCV   Chronic HCV with no hepatic fibrosis per today's fibroscan analysis. The most recent laboratory studies indicate that the liver transaminases are normal, alkaline phosphatase is normal, tests of hepatic synthetic and metabolic function are normal, and the platelet count is normal.      Recent imaging demonstrates a normal appearing liver. Serologic evaluation was negative for all causes of chronic liver disease. Chronic HCV Treatment:  The patient has not been treated for HCV. The patient reports that he has genotype 1A. Elijah Sandro has been ordered and approved.   The patient reports that he has been informed that the medication will be delivered on 09/08/2020. He will begin the 12 week antiviral regime then    Medication education reinforced. Educational material was provided. Screening for Hepatocellular Carcinoma  HCC screening was recently performed. AFP-L3% was WNL. Ultrasound was unremarkable. Treatment of other medical problems in patients with chronic liver disease  There are no contraindications for the patient to take most medications that are necessary for treatment of other medical issues. The patient does not comsume alcohol. Has been sober for 21 years. Normal doses of acetaminophen, as recommended on the label of the bottle, are not hepatotoxic except in the setting of daily alcohol use, even in patients with cirrhosis and can be utilized for pain. Counseling for alcohol in patients with chronic liver disease  The patient was counseled regarding alcohol consumption and the effect of alcohol on chronic liver disease. Substance Use  The patient was counseled regarding the risk of overdose and death from using opioids. Discussed the risk of becoming reinfected with HCV once they are cured if they resume IV drug use or inhaling drugs nasally. The patient has not used drugs since \"21 years\". Vaccinations   Vaccination for viral hepatitis A and B is not required. The patient has serologic evidence of prior exposure or vaccination with immunity. Routine vaccinations against other bacterial and viral agents can be performed as indicated. Annual flu vaccination should be administered if indicated. ALLERGIES  No Known Allergies    MEDICATIONS  Current Outpatient Medications   Medication Sig    sofosbuvir-velpatasvir (EPCLUSA) 400-100 mg tablet Take 1 Tab by mouth daily for 84 days.  Indications: chronic infection of genotype 1 hepatitis C virus    atenoloL (TENORMIN) 100 mg tablet     ergocalciferol (ERGOCALCIFEROL) 1,250 mcg (50,000 unit) capsule     hydrOXYchloroQUINE (PLAQUENIL) 200 mg tablet      No current facility-administered medications for this visit. SYSTEM REVIEW NOT RELATED TO LIVER DISEASE OR REVIEWED ABOVE:  Constitution systems: Negative for fever, chills, weight gain, weight loss. Eyes: Negative for visual changes. ENT: Negative for sore throat, painful swallowing. Respiratory: Negative for cough, hemoptysis, SOB. Cardiology: Negative for chest pain, palpitations. GI:  Negative for constipation or diarrhea. : Negative for urinary frequency, dysuria, hematuria, nocturia. Skin: Negative for rash. Hematology: Negative for easy bruising, blood clots. Musculo-skelatal: Negative for back pain, muscle pain, weakness. Neurologic: Negative for headaches, dizziness, vertigo, memory problems not related to HE. Psychology: Negative for anxiety, depression. FAMILY HISTORY:  The father   of CVA in . The mother  of CVA in . There is no family history of liver disease. The following family members have immune disorders: Mother had Lupus, maternal grandmother had RA,     SOCIAL HISTORY:  The patient has never been . The patient has no children. The patient has never used tobacco products. The patient has never consumed significant amounts of alcohol. The patient has been abstinent from alcohol since  For the last 21 years. The patient is currently receiving disability. The patient was released from [facility] in 2020 after serving a 29 year sentence. PHYSICAL EXAMINATION:  Visit Vitals  /85   Pulse 69   Temp (!) 96.6 °F (35.9 °C) (Tympanic)   Ht 5' 11\" (1.803 m)   Wt 166 lb (75.3 kg)   SpO2 99%   BMI 23.15 kg/m²     General: No acute distress. Eyes: Sclera anicteric. ENT: No oral lesions. Thyroid normal.  Nodes: No adenopathy. Skin: No spider angiomata. No jaundice. No palmar erythema. Respiratory: Lungs clear to auscultation. Cardiovascular: Regular heart rate. No murmurs. No JVD. Abdomen: Soft non-tender. Liver size normal to percussion/palpation. Spleen not palpable. No obvious ascites. Extremities: No edema. No muscle wasting. No gross arthritic changes. Neurologic: Alert and oriented. Cranial nerves grossly intact. No asterixis. LABORATORY STUDIES:  Liver [place] [address] Units 8/3/2020   WBC 3.4 - 10.8 x10E3/uL 8.7   ANC 1.4 - 7.0 x10E3/uL 6.6   HGB 13.0 - 17.7 g/dL 15.2    - 450 x10E3/uL 166   INR 0.8 - 1.2 1.0   AST 0 - 40 IU/L 36   ALT 0 - 44 IU/L 32   Alk Phos 39 - 117 IU/L 76   Bili, Total 0.0 - 1.2 mg/dL 0.7   Bili, Direct 0.00 - 0.40 mg/dL 0.23   Albumin 3.8 - 4.9 g/dL 4.5   BUN 8 - 27 mg/dL 13   Creat 0.76 - 1.27 mg/dL 1.02   Na 134 - 144 mmol/L 140   K 3.5 - 5.2 mmol/L 4.3   Cl 96 - 106 mmol/L 98   CO2 20 - 29 mmol/L 29   Glucose 65 - 99 mg/dL 92       SEROLOGIES:  Serologies Latest Ref Rng & Units 8/3/2020   Hep A Ab, Total Negative Positive (A)   Hep B Surface Ag Negative Negative   Hep B Core Ab, Total Negative Positive (A)   Hep B Surface AB QL  Reactive   Hep C Genotype  1a   HCV RT-PCR, Quant IU/mL 662,000   Ferritin 30 - 400 ng/mL 96   Iron % Saturation 15 - 55 % 29   CHELSEA, IFA  Negative   C-ANCA Neg:<1:20 titer <1:20   P-ANCA Neg:<1:20 titer <1:20   ANCA Neg:<1:20 titer <1:20   ASMCA 0 - 19 Units 7   M2 Ab 0.0 - 20.0 Units 112.3 (H)   Alpha-1 antitrypsin level 101 - 187 mg/dL 121       LIVER HISTOLOGY:  09/2020. [facility]. EkPa was 5.6. IQR/med  29%. . The results suggested a fibrosis level of F1 (due to HCV infection). The CAP score suggests fatty liver. ENDOSCOPIC PROCEDURES:  Not available or performed    RADIOLOGY:  08/2020. Ultrasound of the liver. Normal echogenicity. No solid mass. OTHER TESTING:  Not available or performed    FOLLOW-UP:  All of the issues listed above in the Assessment and Plan were discussed with the patient.   All questions were answered. The patient expressed a clear understanding of the above. [facility] 87 in 6 weeks for HCV treatment monitoring.         Jed Scheuermann, FNP-C  Liver [place] of [address], [address] [place] [address]   [phone]

## 2024-01-23 ENCOUNTER — TELEPHONE (OUTPATIENT)
Age: 64
End: 2024-01-23

## 2024-01-23 NOTE — TELEPHONE ENCOUNTER
1/23/2024    [facility] that patient is wanting to schedule NP appt.    LMVcMail advising has not yet been 3 years so can just sched a f/u appt with Shen instead.    efs

## 2024-04-29 ENCOUNTER — HOSPITAL ENCOUNTER (OUTPATIENT)
Facility: HOSPITAL | Age: 64
Setting detail: SPECIMEN
Discharge: HOME | End: 2024-05-02

## 2024-04-29 ENCOUNTER — OFFICE VISIT (OUTPATIENT)
Age: 64
End: 2024-04-29
Payer: MEDICAID

## 2024-04-29 VITALS
WEIGHT: 191.4 LBS | HEART RATE: 70 BPM | SYSTOLIC BLOOD PRESSURE: 165 MMHG | BODY MASS INDEX: 27.4 KG/M2 | DIASTOLIC BLOOD PRESSURE: 95 MMHG | HEIGHT: 70 IN | OXYGEN SATURATION: 99 % | TEMPERATURE: 97.6 F

## 2024-04-29 DIAGNOSIS — K76.9 CHRONIC LIVER DISEASE: ICD-10-CM

## 2024-04-29 DIAGNOSIS — K76.9 CHRONIC LIVER DISEASE: Primary | ICD-10-CM

## 2024-04-29 LAB — LABCORP SPECIMEN COLLECTION: NORMAL

## 2024-04-29 PROCEDURE — 3077F SYST BP >= 140 MM HG: CPT | Performed by: NURSE PRACTITIONER

## 2024-04-29 PROCEDURE — 3079F DIAST BP 80-89 MM HG: CPT | Performed by: NURSE PRACTITIONER

## 2024-04-29 PROCEDURE — 99214 OFFICE O/P EST MOD 30 MIN: CPT | Performed by: NURSE PRACTITIONER

## 2024-04-29 PROCEDURE — 99001 SPECIMEN HANDLING PT-LAB: CPT

## 2024-04-29 NOTE — PROGRESS NOTES
EXAMINATION:  BP (!) 165/95   Pulse 70   Temp 97.6 °F (36.4 °C) (Skin)   Ht 1.778 m (5' 10\")   Wt 86.8 kg (191 lb 6.4 oz)   SpO2 99%   BMI 27.46 kg/m²     General: No acute distress.   Eyes: Sclera anicteric.   ENT: No oral lesions.  Thyroid normal.  Nodes: No adenopathy.   Skin: No spider angiomata.  No jaundice.  No palmar erythema.  Respiratory: Lungs clear to auscultation.   Cardiovascular: Regular heart rate.  No murmurs.  No JVD.  Abdomen: Soft non-tender.  Liver size normal to percussion/palpation.  Spleen not palpable. No obvious ascites.  Extremities: No edema.  No muscle wasting.  No gross arthritic changes.  Neurologic: Alert and oriented.  Cranial nerves grossly intact.  No asterixis.    LABORATORY STUDIES:   Latest Ref Rng 8/3/2020 10/14/2020 3/4/2021 12/7/2021   HAI - Routine Labs        WBC 3.4 - 10.8 x10E3/uL 8.7  7.6  6.9  6.2    ANC 1.4 - 7.0 x10E3/uL 6.6  5.8  5.2  4.3    HGB 13.0 - 17.7 g/dL 15.2  16.2  15.9  15.4     - 450 x10E3/uL 166  167  161  222    INR 0.8 - 1.2 NA 1.0       AST 0 - 40 IU/L 36  34  32  32    ALT 0 - 44 IU/L 32  20  21  21    Alk Phos 44 - 121 IU/L 76  85  85  77    Bili, Total 0.0 - 1.2 mg/dL 0.7  0.5  0.5  0.4    Bili, Direct 0.00 - 0.40 mg/dL 0.23  0.14  0.16  0.13    Albumin 3.8 - 4.8 g/dL 4.5  4.4  4.6  4.5    BUN 8 - 27 mg/dL 13  11  10  10    Creat 0.76 - 1.27 mg/dL 1.02  1.06  0.87  1.22    Na 134 - 144 mmol/L 140  136  142  136    K 3.5 - 5.2 mmol/L 4.3  4.9  4.3  4.6    Cl 96 - 106 mmol/L 98  98  100  98    CO2 20 - 29 mmol/L 29  29  27  26    Glucose 65 - 99 mg/dL 92  81  95  90      SEROLOGIES:  Serologies Latest Ref Rng & Units 8/3/2020   Hep A Ab, Total Negative Positive (A)   Hep B Surface Ag Negative Negative   Hep B Core Ab, Total Negative Positive (A)   Hep B Surface AB QL  Reactive   Hep C Genotype  1a   HCV RT-PCR, Quant IU/mL 662,000   Ferritin 30 - 400 ng/mL 96   Iron % Saturation 15 - 55 % 29   MARICHUY, IFA  Negative   C-ANCA Neg:<1:20 titer

## 2024-04-30 LAB
ALBUMIN SERPL-MCNC: 4.6 G/DL (ref 3.9–4.9)
ALP SERPL-CCNC: 94 IU/L (ref 44–121)
ALT SERPL-CCNC: 29 IU/L (ref 0–44)
AST SERPL-CCNC: 38 IU/L (ref 0–40)
BASOPHILS # BLD AUTO: 0 X10E3/UL (ref 0–0.2)
BASOPHILS NFR BLD AUTO: 0 %
BILIRUB DIRECT SERPL-MCNC: 0.15 MG/DL (ref 0–0.4)
BILIRUB SERPL-MCNC: 0.5 MG/DL (ref 0–1.2)
BUN SERPL-MCNC: 13 MG/DL (ref 8–27)
BUN/CREAT SERPL: 13 (ref 10–24)
CALCIUM SERPL-MCNC: 9.7 MG/DL (ref 8.6–10.2)
CHLORIDE SERPL-SCNC: 97 MMOL/L (ref 96–106)
CO2 SERPL-SCNC: 24 MMOL/L (ref 20–29)
CREAT SERPL-MCNC: 0.98 MG/DL (ref 0.76–1.27)
EGFRCR SERPLBLD CKD-EPI 2021: 86 ML/MIN/1.73
EOSINOPHIL # BLD AUTO: 0.1 X10E3/UL (ref 0–0.4)
EOSINOPHIL NFR BLD AUTO: 1 %
ERYTHROCYTE [DISTWIDTH] IN BLOOD BY AUTOMATED COUNT: 12.3 % (ref 11.6–15.4)
GLUCOSE SERPL-MCNC: 95 MG/DL (ref 70–99)
HCT VFR BLD AUTO: 48.9 % (ref 37.5–51)
HGB BLD-MCNC: 16.7 G/DL (ref 13–17.7)
IMM GRANULOCYTES # BLD AUTO: 0 X10E3/UL (ref 0–0.1)
IMM GRANULOCYTES NFR BLD AUTO: 0 %
LYMPHOCYTES # BLD AUTO: 1.1 X10E3/UL (ref 0.7–3.1)
LYMPHOCYTES NFR BLD AUTO: 15 %
MCH RBC QN AUTO: 31.9 PG (ref 26.6–33)
MCHC RBC AUTO-ENTMCNC: 34.2 G/DL (ref 31.5–35.7)
MCV RBC AUTO: 94 FL (ref 79–97)
MONOCYTES # BLD AUTO: 0.6 X10E3/UL (ref 0.1–0.9)
MONOCYTES NFR BLD AUTO: 9 %
NEUTROPHILS # BLD AUTO: 5.3 X10E3/UL (ref 1.4–7)
NEUTROPHILS NFR BLD AUTO: 75 %
PLATELET # BLD AUTO: 183 X10E3/UL (ref 150–450)
POTASSIUM SERPL-SCNC: 4.4 MMOL/L (ref 3.5–5.2)
PROT SERPL-MCNC: 7.2 G/DL (ref 6–8.5)
RBC # BLD AUTO: 5.23 X10E6/UL (ref 4.14–5.8)
SODIUM SERPL-SCNC: 135 MMOL/L (ref 134–144)
SPECIMEN STATUS REPORT: NORMAL
SPECIMEN STATUS REPORT: NORMAL
WBC # BLD AUTO: 7.2 X10E3/UL (ref 3.4–10.8)

## 2024-05-01 LAB
DIAGNOSTIC IMP SPEC-IMP: NORMAL
HCV IGG SERPL QL IA: REACTIVE
HCV RNA SERPL NAA+PROBE-ACNC: NORMAL IU/ML
REF LAB TEST REF RANGE: NORMAL

## 2024-09-03 ENCOUNTER — OFFICE VISIT (OUTPATIENT)
Age: 64
End: 2024-09-03
Payer: MEDICAID

## 2024-09-03 ENCOUNTER — HOSPITAL ENCOUNTER (OUTPATIENT)
Facility: HOSPITAL | Age: 64
Setting detail: SPECIMEN
Discharge: HOME | End: 2024-09-06

## 2024-09-03 VITALS
HEART RATE: 73 BPM | HEIGHT: 70 IN | OXYGEN SATURATION: 99 % | WEIGHT: 190.8 LBS | TEMPERATURE: 98.2 F | DIASTOLIC BLOOD PRESSURE: 75 MMHG | BODY MASS INDEX: 27.32 KG/M2 | SYSTOLIC BLOOD PRESSURE: 140 MMHG

## 2024-09-03 DIAGNOSIS — K76.9 CHRONIC LIVER DISEASE: ICD-10-CM

## 2024-09-03 DIAGNOSIS — K76.9 CHRONIC LIVER DISEASE: Primary | ICD-10-CM

## 2024-09-03 LAB — LABCORP SPECIMEN COLLECTION: NORMAL

## 2024-09-03 PROCEDURE — 99214 OFFICE O/P EST MOD 30 MIN: CPT | Performed by: NURSE PRACTITIONER

## 2024-09-03 PROCEDURE — 99001 SPECIMEN HANDLING PT-LAB: CPT

## 2024-09-03 PROCEDURE — 91200 LIVER ELASTOGRAPHY: CPT | Performed by: NURSE PRACTITIONER

## 2024-09-03 PROCEDURE — 3077F SYST BP >= 140 MM HG: CPT | Performed by: NURSE PRACTITIONER

## 2024-09-03 PROCEDURE — 3078F DIAST BP <80 MM HG: CPT | Performed by: NURSE PRACTITIONER

## 2024-09-03 RX ORDER — IBUPROFEN 200 MG
TABLET ORAL
COMMUNITY

## 2024-09-03 RX ORDER — TERAZOSIN 1 MG/1
CAPSULE ORAL
COMMUNITY
End: 2024-09-03

## 2024-09-03 RX ORDER — CIPROFLOXACIN AND DEXAMETHASONE 3; 1 MG/ML; MG/ML
SUSPENSION/ DROPS AURICULAR (OTIC)
COMMUNITY
End: 2024-09-03

## 2024-09-03 NOTE — PROGRESS NOTES
[facility] LIVER [facility]      Chris Link MD, FACP, FACG, FAASLD      TIERRA Bishop-C    Ansley Rodriguez, [facility]   Esther Silveriorosangelafunmilayo, [facility]   Chyna Tyler, [facility]  Stanley Houser, [facility]   Marlen Blum, [facility]   Barbara Reeseon, [facility]      Liver [facility]   at [facility]   [address], [address]   [place]  [address] [phone]   FAX: [phone]  [facility]   [address], [address]   [place]  [address] [phone]   FAX: [phone]         Patient Care Team:  Romeo Alonzo MD as PCP - General (Family Medicine)  Flory Blakely MD (Internal Medicine)      Patient Active Problem List   Diagnosis    Vitamin D deficiency    Hepatitis C virus infection cured after antiviral drug therapy    Sjogren's syndrome (HCC)    Essential hypertension         Jameson LINCOLN Head returns to the [facility] today for education and management of chronic hepatitis C.  He began a 12 week antiviral regime on 09/09/2020 with once daily generic Epclusa and completed the regime on 12/04/2020.  This is the only time the HCV has ever been treated.  HCV has been eradicated.      Fibroscan assessment of hepatic fibrosis and steatosis was also performed during today's appointment.      The patient is concerned regarding the positive AMA and thinks he may have autoimmune hepatitis or Primary Biliary Cholangitis.    He has neither.  The positive AMA is in this case without clinical significance.      The active problem list, all pertinent past medical history, medications, liver histology, endoscopic studies, radiologic findings and laboratory findings related to the liver disorder were reviewed with the patient.     The patient is a 63 y.o. / ([place]) male who was found to have abnormalities in liver chemistries and

## 2024-09-04 LAB
ALBUMIN SERPL-MCNC: 4.9 G/DL (ref 3.9–4.9)
ALP SERPL-CCNC: 83 IU/L (ref 44–121)
ALT SERPL-CCNC: 34 IU/L (ref 0–44)
AST SERPL-CCNC: 42 IU/L (ref 0–40)
BASOPHILS # BLD AUTO: 0 X10E3/UL (ref 0–0.2)
BASOPHILS NFR BLD AUTO: 0 %
BILIRUB DIRECT SERPL-MCNC: 0.16 MG/DL (ref 0–0.4)
BILIRUB SERPL-MCNC: 0.4 MG/DL (ref 0–1.2)
BUN SERPL-MCNC: 14 MG/DL (ref 8–27)
BUN/CREAT SERPL: 13 (ref 10–24)
CALCIUM SERPL-MCNC: 9.5 MG/DL (ref 8.6–10.2)
CHLORIDE SERPL-SCNC: 100 MMOL/L (ref 96–106)
CO2 SERPL-SCNC: 25 MMOL/L (ref 20–29)
CREAT SERPL-MCNC: 1.07 MG/DL (ref 0.76–1.27)
EGFRCR SERPLBLD CKD-EPI 2021: 77 ML/MIN/1.73
EOSINOPHIL # BLD AUTO: 0 X10E3/UL (ref 0–0.4)
EOSINOPHIL NFR BLD AUTO: 0 %
ERYTHROCYTE [DISTWIDTH] IN BLOOD BY AUTOMATED COUNT: 12.1 % (ref 11.6–15.4)
GLUCOSE SERPL-MCNC: 102 MG/DL (ref 70–99)
HCT VFR BLD AUTO: 46.1 % (ref 37.5–51)
HGB BLD-MCNC: 16.1 G/DL (ref 13–17.7)
IMM GRANULOCYTES # BLD AUTO: 0 X10E3/UL (ref 0–0.1)
IMM GRANULOCYTES NFR BLD AUTO: 1 %
LYMPHOCYTES # BLD AUTO: 1.1 X10E3/UL (ref 0.7–3.1)
LYMPHOCYTES NFR BLD AUTO: 14 %
MCH RBC QN AUTO: 32.1 PG (ref 26.6–33)
MCHC RBC AUTO-ENTMCNC: 34.9 G/DL (ref 31.5–35.7)
MCV RBC AUTO: 92 FL (ref 79–97)
MONOCYTES # BLD AUTO: 0.6 X10E3/UL (ref 0.1–0.9)
MONOCYTES NFR BLD AUTO: 7 %
NEUTROPHILS # BLD AUTO: 6.5 X10E3/UL (ref 1.4–7)
NEUTROPHILS NFR BLD AUTO: 78 %
PLATELET # BLD AUTO: 203 X10E3/UL (ref 150–450)
POTASSIUM SERPL-SCNC: 4.7 MMOL/L (ref 3.5–5.2)
PROT SERPL-MCNC: 6.9 G/DL (ref 6–8.5)
RBC # BLD AUTO: 5.02 X10E6/UL (ref 4.14–5.8)
SODIUM SERPL-SCNC: 139 MMOL/L (ref 134–144)
SPECIMEN STATUS REPORT: NORMAL
WBC # BLD AUTO: 8.4 X10E3/UL (ref 3.4–10.8)

## 2024-10-18 ENCOUNTER — HOSPITAL ENCOUNTER (OUTPATIENT)
Facility: HOSPITAL | Age: 64
Setting detail: RECURRING SERIES
Discharge: HOME | End: 2024-10-21
Payer: MEDICAID

## 2024-10-18 PROCEDURE — 97161 PT EVAL LOW COMPLEX 20 MIN: CPT

## 2024-10-18 NOTE — PROGRESS NOTES
PT DAILY TREATMENT NOTE/ LUMBAR EVAL 10-18      Patient Name: Jameson LINCOLN Head    Date: 10/18/2024    : 1960  Insurance: Payor: [facility] MEDICAID / Plan: [facility] HEALTHKEEPERS PLUS / Product Type: *No Product type* /      Patient  verified yes     Visit #   Current / Total 1 12   Time   In / Out 315 345     TREATMENT AREA =  Other low back pain [M54.59]      SUBJECTIVE  Pain Level (0-10 scale): 1/10  []constant []intermittent []improving []worsening []no change since onset    Any medication changes, allergies to medications, adverse drug reactions, diagnosis change, or new procedure performed?: [x] No    [] Yes (see summary sheet for update)  Subjective functional status/changes:     PLOF: functionally independent, no AD, active lifestyle, likes to work out at [employer]   Limitations to PLOF: difficulty with lifting, difficulty with rolling, difficulty with siting up   Mechanism of Injury: Pt reported that his back started hurting a couple of years ago when he was lifting a lot of heavy stone.  Pt reported that he reaggregated it frequently lifting heavy objects.  Pt reported that his back got so much pain that he had to go to chiropractor 5 times in one month. Pt reported that PCP sent him to [facility]opedic [facility] center and they sent him to PT.  Pt reports that pain is located in lower back and has some occasional pain numbness in right LE.  Current symptoms/Complaints: 1/10 at best with ice and rest; 10/10 at worst with lifting; pt reports they are unable to sleep through the night secondary to pain  Previous Treatment/Compliance: ice   PMHx/Surgical Hx: HTN, Sjogren syndrome,   Work Hx: not working right now  Living Situation: 1 story home [address]   Pt Goals: \"no more pain and numbness\"  Barriers: []pain []financial []time []transportation []other  Motivation: good  Substance use: []Alcohol []Tobacco []other:   Cognition: A & O x 3      Patient Self Reported Health Status: good   Rehabilitation 
necessary.    Physician's Signature:____________________________Date:_________TIME:________                                      Jay Tang MD  Insurance: Payor: [facility] MEDICAID / Plan: [facility] HEALTHKEEPERS PLUS / Product Type: *No Product type* /      ** Signature, Date and Time must be completed for valid certification **    [facility] [place] [address]  Ph [phone]  Fx [phone]

## 2024-10-22 ENCOUNTER — HOSPITAL ENCOUNTER (OUTPATIENT)
Facility: HOSPITAL | Age: 64
Setting detail: RECURRING SERIES
Discharge: HOME | End: 2024-10-25
Payer: MEDICAID

## 2024-10-22 PROCEDURE — 97112 NEUROMUSCULAR REEDUCATION: CPT

## 2024-10-22 PROCEDURE — 97530 THERAPEUTIC ACTIVITIES: CPT

## 2024-10-22 PROCEDURE — 97110 THERAPEUTIC EXERCISES: CPT

## 2024-10-22 NOTE — PROGRESS NOTES
PHYSICAL / OCCUPATIONAL THERAPY - DAILY TREATMENT NOTE     Patient Name: Jameson LINCOLN Head    Date: 10/22/2024    : 1960  Insurance: Payor: [facility] MEDICAID / Plan: [facility] HEALTHKEEPERS PLUS / Product Type: *No Product type* /      Patient  verified Yes     Visit #   Current / Total 2 12   Time   In / Out 2:33 3:12   Pain   In / Out 1 1   Subjective Functional Status/Changes: Pt reports only mild pain/discomfort today   Changes to:  Allergies, Med Hx, Sx Hx?   no       TREATMENT AREA =  Other low back pain [M54.59]    If an interpreting service is utilized for treatment of this patient, the contents of this document represent the material reviewed with the patient via the .     OBJECTIVE        Therapeutic Procedures:  Tx Min Billable or 1:1 Min (if diff from Tx Min) Procedure, Rationale, Specifics   20  87258 Therapeutic Exercise (timed):  increase ROM, strength, coordination, balance, and proprioception to improve patient's ability to progress to PLOF and address remaining functional goals. (see flow sheet as applicable)    Details if applicable:       10  10607 Neuromuscular Re-Education (timed):  improve balance, coordination, kinesthetic sense, posture, core stability and proprioception to improve patient's ability to develop conscious control of individual muscles and awareness of position of extremities in order to progress to PLOF and address remaining functional goals. (see flow sheet as applicable)    Details if applicable:     9  41425 Therapeutic Activity (timed):  use of dynamic activities replicating functional movements to increase ROM, strength, coordination, balance, and proprioception in order to improve patient's ability to progress to PLOF and address remaining functional goals.  (see flow sheet as applicable)     Details if applicable:                 Details if applicable:     39  [facility] Totals Reminder: bill using total billable min of TIMED therapeutic procedures

## 2024-10-25 ENCOUNTER — HOSPITAL ENCOUNTER (OUTPATIENT)
Facility: HOSPITAL | Age: 64
Setting detail: RECURRING SERIES
Discharge: HOME | End: 2024-10-28
Payer: MEDICAID

## 2024-10-25 PROCEDURE — 97112 NEUROMUSCULAR REEDUCATION: CPT

## 2024-10-25 PROCEDURE — 97110 THERAPEUTIC EXERCISES: CPT

## 2024-10-25 PROCEDURE — 97530 THERAPEUTIC ACTIVITIES: CPT

## 2024-10-25 NOTE — PROGRESS NOTES
PHYSICAL / OCCUPATIONAL THERAPY - DAILY TREATMENT NOTE     Patient Name: Jameson LINCOLN Head    Date: 10/25/2024    : 1960  Insurance: Payor: [facility] MEDICAID / Plan: [facility] HEALTHKEEPERS PLUS / Product Type: *No Product type* /      Patient  verified Yes   Visit #   Current / Total 3 12   Time   In / Out 11:15 11:55   Pain   In / Out 1 1   Subjective Functional Status/Changes: Patient states he just completed a back, core, and arms workout at [employer].   Changes to:  Allergies, Med Hx, Sx Hx?   no       TREATMENT AREA =  Other low back pain [M54.59]    If an interpreting service is utilized for treatment of this patient, the contents of this document represent the material reviewed with the patient via the .     OBJECTIVE    Therapeutic Procedures:  Tx Min Billable or 1:1 Min (if diff from Tx Min) Procedure, Rationale, Specifics   10  02520 Therapeutic Exercise (timed):  increase ROM, strength, coordination, balance, and proprioception to improve patient's ability to progress to PLOF and address remaining functional goals. (see flow sheet as applicable)    Details if applicable:       15  48467 Therapeutic Activity (timed):  use of dynamic activities replicating functional movements to increase ROM, strength, coordination, balance, and proprioception in order to improve patient's ability to progress to PLOF and address remaining functional goals.  (see flow sheet as applicable)    Details if applicable:     15  05034 Neuromuscular Re-Education (timed):  improve balance, coordination, kinesthetic sense, posture, core stability and proprioception to improve patient's ability to develop conscious control of individual muscles and awareness of position of extremities in order to progress to PLOF and address remaining functional goals. (see flow sheet as applicable)     Details if applicable:     40  [facility] Totals Reminder: bill using total billable min of TIMED therapeutic procedures

## 2024-10-28 ENCOUNTER — HOSPITAL ENCOUNTER (OUTPATIENT)
Facility: HOSPITAL | Age: 64
Setting detail: RECURRING SERIES
Discharge: HOME | End: 2024-10-31
Payer: MEDICAID

## 2024-10-28 PROCEDURE — 97110 THERAPEUTIC EXERCISES: CPT

## 2024-10-28 PROCEDURE — 97535 SELF CARE MNGMENT TRAINING: CPT

## 2024-10-28 PROCEDURE — 97530 THERAPEUTIC ACTIVITIES: CPT

## 2024-10-28 NOTE — PROGRESS NOTES
PHYSICAL / OCCUPATIONAL THERAPY - DAILY TREATMENT NOTE     Patient Name: Jameson LINCOLN Head    Date: 10/28/2024    : 1960  Insurance: Payor: [facility] MEDICAID / Plan: [facility] HEALTHKEEPERS PLUS / Product Type: *No Product type* /      Patient  verified Yes     Visit #   Current / Total 4 12   Time   In / Out 11:53 12:32   Pain   In / Out 1/10 1/10   Subjective Functional Status/Changes: \"I don't have too much pain really just some numbness in my Right Leg down to my foot.\"   Changes to:  Allergies, Med Hx, Sx Hx?   no       TREATMENT AREA =  Other low back pain [M54.59]    If an interpreting service is utilized for treatment of this patient, the contents of this document represent the material reviewed with the patient via the .     OBJECTIVE  Therapeutic Procedures:  Tx Min Billable or 1:1 Min (if diff from Tx Min) Procedure, Rationale, Specifics   21  59252 Therapeutic Exercise (timed):  increase ROM, strength, coordination, balance, and proprioception to improve patient's ability to progress to PLOF and address remaining functional goals. (see flow sheet as applicable)    Details if applicable:         35871 Neuromuscular Re-Education (timed):  improve balance, coordination, kinesthetic sense, posture, core stability and proprioception to improve patient's ability to develop conscious control of individual muscles and awareness of position of extremities in order to progress to PLOF and address remaining functional goals. (see flow sheet as applicable)    Details if applicable:     10  42268 Therapeutic Activity (timed):  use of dynamic activities replicating functional movements to increase ROM, strength, coordination, balance, and proprioception in order to improve patient's ability to progress to PLOF and address remaining functional goals.  (see flow sheet as applicable)     Details if applicable:     8  44086 Self Care/Home Management (timed):  improve patient knowledge and

## 2024-10-31 ENCOUNTER — HOSPITAL ENCOUNTER (OUTPATIENT)
Facility: HOSPITAL | Age: 64
Setting detail: RECURRING SERIES
Discharge: HOME | End: 2024-11-03
Payer: MEDICAID

## 2024-10-31 PROCEDURE — 97112 NEUROMUSCULAR REEDUCATION: CPT

## 2024-10-31 PROCEDURE — 97110 THERAPEUTIC EXERCISES: CPT

## 2024-10-31 PROCEDURE — 97530 THERAPEUTIC ACTIVITIES: CPT

## 2024-10-31 PROCEDURE — 97535 SELF CARE MNGMENT TRAINING: CPT

## 2024-10-31 NOTE — PROGRESS NOTES
PHYSICAL / OCCUPATIONAL THERAPY - DAILY TREATMENT NOTE     Patient Name: Jameson LINCOLN Head    Date: 10/31/2024    : 1960  Insurance: Payor: [facility] MEDICAID / Plan: [facility] HE[employer]KEEPERS PLUS / Product Type: *No Product type* /      Patient  verified Yes     Visit #   Current / Total 5 12   Time   In / Out 1:50 2:43   Pain   In / Out 1/10 1/10   Subjective Functional Status/Changes: \"I don't have too much pain right now.\"   Changes to:  Allergies, Med Hx, Sx Hx?   no       TREATMENT AREA =  Other low back pain [M54.59]    If an interpreting service is utilized for treatment of this patient, the contents of this document represent the material reviewed with the patient via the .     OBJECTIVE    Therapeutic Procedures:  Tx Min Billable or 1:1 Min (if diff from Tx Min) Procedure, Rationale, Specifics     35545 Therapeutic Exercise (timed):  increase ROM, strength, coordination, balance, and proprioception to improve patient's ability to progress to PLOF and address remaining functional goals. (see flow sheet as applicable)    Details if applicable:       10  16371 Neuromuscular Re-Education (timed):  improve balance, coordination, kinesthetic sense, posture, core stability and proprioception to improve patient's ability to develop conscious control of individual muscles and awareness of position of extremities in order to progress to PLOF and address remaining functional goals. (see flow sheet as applicable)    Details if applicable:     12  16819 Therapeutic Activity (timed):  use of dynamic activities replicating functional movements to increase ROM, strength, coordination, balance, and proprioception in order to improve patient's ability to progress to PLOF and address remaining functional goals.  (see flow sheet as applicable)     Details if applicable:     10  34821 Self Care/Home Management (timed):  improve patient knowledge and understanding of home injury/symptom/pain management,  worst  Current: 3/10 pain at worst in the last week  10/25/24, PROGRESSING    PLAN  Yes  Continue plan of care  []  Upgrade activities as tolerated  []  Discharge due to :  []  Other:    Js Vazquez PTA    10/31/2024    8:57 AM    Future Appointments   Date Time Provider Department Center   10/31/2024  1:50 PM Js Vazquez PTA [facility]   9/3/2025  1:00 PM Stanley Houser, APRN - CNP [facility]

## 2024-11-05 ENCOUNTER — HOSPITAL ENCOUNTER (OUTPATIENT)
Facility: HOSPITAL | Age: 64
Setting detail: RECURRING SERIES
Discharge: HOME | End: 2024-11-08
Payer: MEDICAID

## 2024-11-05 PROCEDURE — 97535 SELF CARE MNGMENT TRAINING: CPT

## 2024-11-05 PROCEDURE — 97530 THERAPEUTIC ACTIVITIES: CPT

## 2024-11-05 PROCEDURE — 97112 NEUROMUSCULAR REEDUCATION: CPT

## 2024-11-05 PROCEDURE — 97110 THERAPEUTIC EXERCISES: CPT

## 2024-11-05 NOTE — PROGRESS NOTES
PHYSICAL / OCCUPATIONAL THERAPY - DAILY TREATMENT NOTE     Patient Name: Jameson LINCOLN Head    Date: 2024    : 1960  Insurance: Payor: [facility] MEDICAID / Plan: [facility] HE[employer]KEEPERS PLUS / Product Type: *No Product type* /      Patient  verified Yes     Visit #   Current / Total 6 12   Time   In / Out 12:36 1:29   Pain   In / Out 0/10 0/10   Subjective Functional Status/Changes: \"I don't have any pain right now. I had a little 'tweak' in my back the other day when I was washing a truck. I think I just tried to pick something up with bad posture and it hurt my back a little.\"   Changes to:  Allergies, Med Hx, Sx Hx?   no       TREATMENT AREA =  Other low back pain [M54.59]    If an interpreting service is utilized for treatment of this patient, the contents of this document represent the material reviewed with the patient via the .     OBJECTIVE  Therapeutic Procedures:  Tx Min Billable or 1:1 Min (if diff from Tx Min) Procedure, Rationale, Specifics     47135 Therapeutic Exercise (timed):  increase ROM, strength, coordination, balance, and proprioception to improve patient's ability to progress to PLOF and address remaining functional goals. (see flow sheet as applicable)    Details if applicable:       10  09282 Neuromuscular Re-Education (timed):  improve balance, coordination, kinesthetic sense, posture, core stability and proprioception to improve patient's ability to develop conscious control of individual muscles and awareness of position of extremities in order to progress to PLOF and address remaining functional goals. (see flow sheet as applicable)    Details if applicable:     12  85556 Therapeutic Activity (timed):  use of dynamic activities replicating functional movements to increase ROM, strength, coordination, balance, and proprioception in order to improve patient's ability to progress to PLOF and address remaining functional goals.  (see flow sheet as applicable)

## 2024-11-07 ENCOUNTER — HOSPITAL ENCOUNTER (OUTPATIENT)
Facility: HOSPITAL | Age: 64
Setting detail: RECURRING SERIES
Discharge: HOME | End: 2024-11-10
Payer: MEDICAID

## 2024-11-07 PROCEDURE — 97530 THERAPEUTIC ACTIVITIES: CPT

## 2024-11-07 PROCEDURE — 97535 SELF CARE MNGMENT TRAINING: CPT

## 2024-11-07 PROCEDURE — 97110 THERAPEUTIC EXERCISES: CPT

## 2024-11-07 NOTE — PROGRESS NOTES
considerable cueing to maintain good form 10/28/24     3.   Patient will improve pain in low  back to 2/10 at worst to improve lifting tolerance and restore prior level of function.  Eval Status: 10/10 at worst  Current: 3/10 pain at worst in the last week  10/25/24, PROGRESSING    PLAN  Yes  Continue plan of care  []  Upgrade activities as tolerated  []  Discharge due to :  []  Other:    Js Vazquez PTA    11/7/2024    8:11 AM    Future Appointments   Date Time Provider Department Center   11/7/2024  2:30 PM Js Vazquez PTA [facility]   11/13/2024 10:30 AM Js Vazquez PTA [facility]   11/15/2024  1:10 PM Js Vazquez PTA [facility]   9/3/2025  1:00 PM Stanley Houser APRN - CNP [facility]

## 2024-11-13 ENCOUNTER — APPOINTMENT (OUTPATIENT)
Facility: HOSPITAL | Age: 64
End: 2024-11-13
Payer: MEDICAID

## 2024-11-14 ENCOUNTER — HOSPITAL ENCOUNTER (OUTPATIENT)
Facility: HOSPITAL | Age: 64
Setting detail: RECURRING SERIES
Discharge: HOME | End: 2024-11-17
Payer: MEDICAID

## 2024-11-14 PROCEDURE — 97110 THERAPEUTIC EXERCISES: CPT

## 2024-11-14 PROCEDURE — 97112 NEUROMUSCULAR REEDUCATION: CPT

## 2024-11-14 PROCEDURE — 97530 THERAPEUTIC ACTIVITIES: CPT

## 2024-11-15 ENCOUNTER — HOSPITAL ENCOUNTER (OUTPATIENT)
Facility: HOSPITAL | Age: 64
Setting detail: RECURRING SERIES
Discharge: HOME | End: 2024-11-18
Payer: MEDICAID

## 2024-11-15 PROCEDURE — 97112 NEUROMUSCULAR REEDUCATION: CPT

## 2024-11-15 PROCEDURE — 97110 THERAPEUTIC EXERCISES: CPT

## 2024-11-15 NOTE — PROGRESS NOTES
[] Other detail:       Objective Information/Functional Measures/Assessment    Patient Presentation:  Pt presents with c/o increased low back pain this morning that eased up as the day progressed  Assessment for treatment and intervention:   Pt required max verbal and tactile cues for proper posture and technique when performing side lying hip abduction  Pt responded well to treatment, as evidenced by increased lumbar AROM when compared to initial evaluation and a decrease in lumbar pain when utilizing proper body mechanics during glute exercises  Pt has attended physical therapy for 9 visits and has made progress in lumbar AROM and overall pain levels since initial evaluation  Suggestions for next visit:  Progress as tolerated  Patient tolerated today's session well.   Patient is making good progress towards goals and will benefit from continued therapy to achieve goals and maximize function/restore PLOF.      Patient will continue to benefit from skilled PT / OT services to modify and progress therapeutic interventions, analyze and address functional mobility deficits, analyze and address ROM deficits, analyze and address strength deficits, analyze and cue for proper movement patterns, analyze and modify for postural abnormalities, and instruct in home and community integration to address functional deficits and attain remaining goals.    Progress toward goals / Updated goals:  []  See Progress Note/Recertification    Short Term Goals: To be accomplished in 6 treatments:  Patient will be independent and compliant with HEP to progress toward goals and restore functional mobility.   Eval Status: issued at eval  PN 11/15/2024: pt compliant with HEP, confirmed by accurate demo  MET     Patient will improve pain in low back to 5/10  to improve lifting tolerance and restore prior level of function.  Eval Status: 10/10 at worst  PN 11/15/2024: 3-4/10 at worst  MET     Pt will have painfree lumbar AROM WFL to aid in 
Continue per plan of care.       Thank you for this referral.   Js Vazquez, PTA 11/15/2024 8:01 AM

## 2024-11-21 ENCOUNTER — HOSPITAL ENCOUNTER (OUTPATIENT)
Facility: HOSPITAL | Age: 64
Setting detail: RECURRING SERIES
Discharge: HOME | End: 2024-11-24
Payer: MEDICAID

## 2024-11-21 PROCEDURE — 97112 NEUROMUSCULAR REEDUCATION: CPT

## 2024-11-21 PROCEDURE — 97530 THERAPEUTIC ACTIVITIES: CPT

## 2024-11-21 PROCEDURE — 97110 THERAPEUTIC EXERCISES: CPT

## 2024-11-21 NOTE — PROGRESS NOTES
PHYSICAL / OCCUPATIONAL THERAPY - DAILY TREATMENT NOTE     Patient Name: Jameson LINCOLN Head    Date: 2024    : 1960  Insurance: Payor: [facility] MEDICAID / Plan: [facility] HEALTHKEEPERS PLUS / Product Type: *No Product type* /      Patient  verified Yes     Visit #   Current / Total 10 12   Time   In / Out 3:55 4:36   Pain   In / Out 2 2   Subjective Functional Status/Changes: Pt reports tweaking his back 2 days ago when getting out his truck   Changes to:  Allergies, Med Hx, Sx Hx?   no       TREATMENT AREA =  Other low back pain [M54.59]    If an interpreting service is utilized for treatment of this patient, the contents of this document represent the material reviewed with the patient via the .     OBJECTIVE        Therapeutic Procedures:  Tx Min Billable or 1:1 Min (if diff from Tx Min) Procedure, Rationale, Specifics   20  55954 Therapeutic Exercise (timed):  increase ROM, strength, coordination, balance, and proprioception to improve patient's ability to progress to PLOF and address remaining functional goals. (see flow sheet as applicable)    Details if applicable:       10  83670 Neuromuscular Re-Education (timed):  improve balance, coordination, kinesthetic sense, posture, core stability and proprioception to improve patient's ability to develop conscious control of individual muscles and awareness of position of extremities in order to progress to PLOF and address remaining functional goals. (see flow sheet as applicable)    Details if applicable:     11  70118 Therapeutic Activity (timed):  use of dynamic activities replicating functional movements to increase ROM, strength, coordination, balance, and proprioception in order to improve patient's ability to progress to PLOF and address remaining functional goals.  (see flow sheet as applicable)     Details if applicable:           Details if applicable:            Details if applicable:     41  [facility] Totals Reminder: bill using

## 2024-11-26 ENCOUNTER — HOSPITAL ENCOUNTER (OUTPATIENT)
Facility: HOSPITAL | Age: 64
Setting detail: RECURRING SERIES
Discharge: HOME | End: 2024-11-29
Payer: MEDICAID

## 2024-11-26 PROCEDURE — 97530 THERAPEUTIC ACTIVITIES: CPT

## 2024-11-26 PROCEDURE — 97112 NEUROMUSCULAR REEDUCATION: CPT

## 2024-11-26 PROCEDURE — 97110 THERAPEUTIC EXERCISES: CPT

## 2024-11-26 NOTE — PROGRESS NOTES
PHYSICAL / OCCUPATIONAL THERAPY - DAILY TREATMENT NOTE     Patient Name: Jameson LINCOLN Head    Date: 2024    : 1960  Insurance: Payor: [facility] MEDICAID / Plan: [facility] HEALTHKEEPERS PLUS / Product Type: *No Product type* /      Patient  verified Yes     Visit #   Current / Total 11 12   Time   In / Out 12:35 1:13   Pain   In / Out 2 0   Subjective Functional Status/Changes: Patient states he went to the chiropractor for an adjustment this morning.  Patient states he's not sure that therapy is really helping him, stating he continues to have daily right sided LBP and right LE radicular symptoms.  Patient states he has f/u with ortho MD in January.   Changes to:  Allergies, Med Hx, Sx Hx?   no       TREATMENT AREA =  Other low back pain [M54.59]    If an interpreting service is utilized for treatment of this patient, the contents of this document represent the material reviewed with the patient via the .     OBJECTIVE    Therapeutic Procedures:  Tx Min Billable or 1:1 Min (if diff from Tx Min) Procedure, Rationale, Specifics   11  08372 Therapeutic Exercise (timed):  increase ROM, strength, coordination, balance, and proprioception to improve patient's ability to progress to PLOF and address remaining functional goals. (see flow sheet as applicable)    Details if applicable:       14  86275 Therapeutic Activity (timed):  use of dynamic activities replicating functional movements to increase ROM, strength, coordination, balance, and proprioception in order to improve patient's ability to progress to PLOF and address remaining functional goals.  (see flow sheet as applicable)    Details if applicable:     13  58378 Neuromuscular Re-Education (timed):  improve balance, coordination, kinesthetic sense, posture, core stability and proprioception to improve patient's ability to develop conscious control of individual muscles and awareness of position of extremities in order to progress to PLOF

## 2024-12-03 ENCOUNTER — HOSPITAL ENCOUNTER (OUTPATIENT)
Facility: HOSPITAL | Age: 64
Setting detail: RECURRING SERIES
Discharge: HOME | End: 2024-12-06
Payer: MEDICAID

## 2024-12-03 PROCEDURE — 97112 NEUROMUSCULAR REEDUCATION: CPT

## 2024-12-03 PROCEDURE — 97110 THERAPEUTIC EXERCISES: CPT

## 2024-12-03 PROCEDURE — 97530 THERAPEUTIC ACTIVITIES: CPT

## 2024-12-03 NOTE — PROGRESS NOTES
PHYSICAL / OCCUPATIONAL THERAPY - DAILY TREATMENT NOTE     Patient Name: Jameson LINCOLN Head    Date: 12/3/2024    : 1960  Insurance: Payor: [facility] MEDICAID / Plan: [facility]ERSON [facility] HEALTHKEEPERS PLUS / Product Type: *No Product type* /      Patient  verified Yes     Visit #   Current / Total 12 12   Time   In / Out 12:30 1:10   Pain   In / Out 3 1-2   Subjective Functional Status/Changes: Pt reports experiencing some pain and tightness today, limiting his movements in the shower.   Changes to:  Allergies, Med Hx, Sx Hx?   no       TREATMENT AREA =  Other low back pain [M54.59]    If an interpreting service is utilized for treatment of this patient, the contents of this document represent the material reviewed with the patient via the .     OBJECTIVE    Therapeutic Procedures:  Tx Min Billable or 1:1 Min (if diff from Tx Min) Procedure, Rationale, Specifics   24  92342 Therapeutic Exercise (timed):  increase ROM, strength, coordination, balance, and proprioception to improve patient's ability to progress to PLOF and address remaining functional goals. (see flow sheet as applicable)    Details if applicable:       8  30390 Neuromuscular Re-Education (timed):  improve balance, coordination, kinesthetic sense, posture, core stability and proprioception to improve patient's ability to develop conscious control of individual muscles and awareness of position of extremities in order to progress to PLOF and address remaining functional goals. (see flow sheet as applicable)    Details if applicable:     8  46256 Therapeutic Activity (timed):  use of dynamic activities replicating functional movements to increase ROM, strength, coordination, balance, and proprioception in order to improve patient's ability to progress to PLOF and address remaining functional goals.  (see flow sheet as applicable)     Details if applicable:     40  [facility] Totals Reminder: bill using total billable min of TIMED therapeutic

## 2024-12-03 NOTE — PROGRESS NOTES
[facility]  [address], [place] [address]  Phone [phone]  Fax [phone]    Physical Therapy Discharge Summary    Patient name: Jameson Morrow Start of Care: 10/18/24   Referral source: Jay Tang MD : 1960   Medical/Treatment Diagnosis: Other low back pain [M54.59] Onset Date:24   Prior Hospitalization: see medical history Provider#: [id number]   Medications: Verified on Patient Summary List     Comorbidities:  Other: HTN, Sjogrens syndrome   Prior Level of Function: functionally independent, no AD, active lifestyle, likes to work out at [employer]    Visits from Start of Care: 12    Missed Visits: 0    Reporting Period: 10/18/24 to 12/3/24    Assessment / Summary of Care:  Patient has participated in 12 visits of skilled PT for treatment of chronic LBP.  Patient has met STG's regarding HEP compliance and pain < 5/10.  However, patient has either made no appreciable progress or has regressed in all remaining goals, including those addressing trunk AROM, bilateral LE strength, Oswestry score, and LTG regarding pain.  Patient continues to have deficits as well as right sided SIJ pain during trunk extension and right trunk sidebending, but otherwise moves within a functional range.  Patient has made no change in bilateral glute med or max strength as compared to last progress note, and is only slightly stronger in these muscle groups as compared to his initial evaluation.  Patient continues to report frequent pain up to 4/10 during functional activities and is with subjective reports of no particular progress in symptoms or function since being in skilled PT.  Patient is no longer a candidate for skilled PT due to no appreciable progress and for that reason is discharged at this time.  Patient has been referred back to MD to discuss further pain relieving options.  Thank you for the referral to [facility].    Short Term Goals: To be

## 2024-12-03 NOTE — PROGRESS NOTES
Physical Therapy Discharge Instructions    [facility]  [address] [place] [address]  Ph [phone]  Fx [phone]      Patient: Jameson LINCOLN Head  : 1960      Continue Home Exercise Program 1 times per day for 4-6 weeks, then decrease to 3 times per week      Continue with    [x] Ice  as needed     [x] Heat           Follow up with MD:     [x] Upon completion of therapy     [] As needed      Recommendations:     [x]   Return to activity with home program    []   Return to activity with the following modifications:       []Post Rehab Program    []Join Independent aquatic program     []Return to/join local gym      Additional Comments: Thank you for choosing [facility]!    Aurelia Chen, PT 12/3/2024 9:35 AM

## 2024-12-06 ENCOUNTER — APPOINTMENT (OUTPATIENT)
Facility: HOSPITAL | Age: 64
End: 2024-12-06
Payer: MEDICAID

## 2024-12-10 ENCOUNTER — APPOINTMENT (OUTPATIENT)
Facility: HOSPITAL | Age: 64
End: 2024-12-10
Payer: MEDICAID

## 2024-12-12 ENCOUNTER — APPOINTMENT (OUTPATIENT)
Facility: HOSPITAL | Age: 64
End: 2024-12-12
Payer: MEDICAID